# Patient Record
Sex: MALE | Race: WHITE | NOT HISPANIC OR LATINO | Employment: FULL TIME | URBAN - METROPOLITAN AREA
[De-identification: names, ages, dates, MRNs, and addresses within clinical notes are randomized per-mention and may not be internally consistent; named-entity substitution may affect disease eponyms.]

---

## 2018-02-12 ENCOUNTER — OFFICE VISIT (OUTPATIENT)
Dept: URGENT CARE | Age: 68
End: 2018-02-12
Payer: COMMERCIAL

## 2018-02-12 VITALS
HEIGHT: 70 IN | OXYGEN SATURATION: 98 % | HEART RATE: 78 BPM | DIASTOLIC BLOOD PRESSURE: 63 MMHG | BODY MASS INDEX: 26.92 KG/M2 | SYSTOLIC BLOOD PRESSURE: 136 MMHG | WEIGHT: 188 LBS | RESPIRATION RATE: 18 BRPM | TEMPERATURE: 98.6 F

## 2018-02-12 DIAGNOSIS — J20.9 ACUTE BRONCHITIS, UNSPECIFIED ORGANISM: Primary | ICD-10-CM

## 2018-02-12 PROCEDURE — 99214 OFFICE O/P EST MOD 30 MIN: CPT | Performed by: PREVENTIVE MEDICINE

## 2018-02-12 RX ORDER — NITROGLYCERIN 0.4 MG/1
0.4 TABLET SUBLINGUAL
COMMUNITY
Start: 2017-07-05 | End: 2022-06-29

## 2018-02-12 RX ORDER — TAMSULOSIN HYDROCHLORIDE 0.4 MG/1
0.4 CAPSULE ORAL
COMMUNITY
End: 2018-06-20 | Stop reason: SDUPTHER

## 2018-02-12 RX ORDER — ATORVASTATIN CALCIUM 40 MG/1
TABLET, FILM COATED ORAL
COMMUNITY
Start: 2017-12-14

## 2018-02-12 RX ORDER — DUTASTERIDE 0.5 MG/1
0.5 CAPSULE, LIQUID FILLED ORAL
COMMUNITY
End: 2018-07-12 | Stop reason: SDUPTHER

## 2018-02-12 RX ORDER — CLOPIDOGREL BISULFATE 75 MG/1
75 TABLET ORAL
COMMUNITY
Start: 2017-07-28 | End: 2018-10-05

## 2018-02-12 RX ORDER — ALBUTEROL SULFATE 90 UG/1
2 AEROSOL, METERED RESPIRATORY (INHALATION) EVERY 4 HOURS PRN
Qty: 1 INHALER | Refills: 0 | Status: SHIPPED | OUTPATIENT
Start: 2018-02-12 | End: 2020-02-06 | Stop reason: ALTCHOICE

## 2018-02-12 RX ORDER — AMOXICILLIN AND CLAVULANATE POTASSIUM 875; 125 MG/1; MG/1
1 TABLET, FILM COATED ORAL EVERY 12 HOURS SCHEDULED
Qty: 20 TABLET | Refills: 0 | Status: SHIPPED | OUTPATIENT
Start: 2018-02-12 | End: 2018-02-22

## 2018-02-12 RX ORDER — PREDNISONE 10 MG/1
TABLET ORAL
Qty: 30 TABLET | Refills: 0 | Status: ON HOLD | OUTPATIENT
Start: 2018-02-12 | End: 2018-10-23

## 2018-02-12 RX ORDER — NICOTINE POLACRILEX 2 MG
1 GUM BUCCAL
COMMUNITY
End: 2022-06-29

## 2018-02-12 RX ORDER — DIAZEPAM 5 MG/1
TABLET ORAL
COMMUNITY
Start: 2017-06-11

## 2018-02-12 NOTE — PROGRESS NOTES
[address] [facility]        NAME: Precious Mack is a 78 y o  male  : 1950    MRN: [id number]  DATE: 2018  TIME: 8:39 AM    Assessment and Plan   Acute bronchitis, unspecified organism [J20 9]  1  Acute bronchitis, unspecified organism  predniSONE 10 mg tablet    amoxicillin-clavulanate (AUGMENTIN) 875-125 mg per tablet    albuterol (PROVENTIL HFA,VENTOLIN HFA) 90 mcg/act inhaler         Patient Instructions       Begin prednisone taper with food  Use inhaler as directed for coughing fits/shortness of breath  If no improvement in 2-3 days, begin Augmentin  Finish all if taking  Probiotics daily if taking  Follow up with PCP in 3-5 days  Proceed to  ER if symptoms worsen  Chief Complaint     Chief Complaint   Patient presents with    Cold Like Symptoms         History of Present Illness   Precious Mack presents to the clinic c/o    23-year-old male presents complaints cough x5 days  He states the cough occurs when he breathes, talks, and he is also complaining of coughing fits in the morning and at night  The cough is productive at times  He does complain of some nasal congestion but denies any nasal drainage  He feels somewhat better overall, but is still experiencing coughing fits  He had a subjective fever the first day of the illness  He has been taking Robitussin for the cough  Review of Systems   Review of Systems   Constitutional: Positive for fatigue and fever  Negative for activity change  HENT: Positive for congestion  Negative for rhinorrhea and sinus pain  Respiratory: Positive for cough and chest tightness  Negative for wheezing  Cardiovascular: Negative for chest pain  All other systems reviewed and are negative          Current Medications     Long-Term Prescriptions   Medication Sig Dispense Refill    aspirin 81 MG tablet Take 81 mg by mouth      atorvastatin (LIPITOR) 40 mg tablet TAKE 1 TABLET BY MOUTH DAILY AT NIGHT      Biotin 1 MG CAPS Take 1 tablet by mouth      Cholecalciferol 1000 units CHEW Chew      clopidogrel (PLAVIX) 75 mg tablet Take 75 mg by mouth      diazepam (VALIUM) 5 mg tablet TAKE 1 TABLET BY MOUTH DAILY AS NEEDED      dutasteride (AVODART) 0 5 mg capsule Take 0 5 mg by mouth      nitroglycerin (NITROSTAT) 0 4 mg SL tablet Place 0 4 mg under the tongue      tamsulosin (FLOMAX) 0 4 mg Take 0 4 mg by mouth         Current Allergies     Allergies as of 02/12/2018    (No Known Allergies)            The following portions of the patient's history were reviewed and updated as appropriate: allergies, current medications, past family history, past medical history, past social history, past surgical history and problem list     Objective   /63   Pulse 78   Temp 98 6 °F (37 °C) (Temporal)   Resp 18   Ht 5' 10" (1 778 m)   Wt 85 3 kg (188 lb)   SpO2 98%   BMI 26 98 kg/m²        Physical Exam     Physical Exam   Constitutional: He is oriented to person, place, and time  He appears well-developed and well-nourished  HENT:   Head: Normocephalic  Right Ear: External ear normal    Left Ear: External ear normal    Nose: Nose normal    Mouth/Throat: Oropharynx is clear and moist    Neck: Neck supple  Cardiovascular: Normal rate, regular rhythm and normal heart sounds  Pulmonary/Chest: Breath sounds normal    Dry cough noted on exam   Neurological: He is alert and oriented to person, place, and time  Psychiatric: He has a normal mood and affect  His behavior is normal    Nursing note and vitals reviewed

## 2018-02-12 NOTE — PATIENT INSTRUCTIONS
Begin prednisone taper with food  Use inhaler as directed for coughing fits/shortness of breath  If no improvement in 2-3 days, begin Augmentin  Finish all if taking  Probiotics daily if taking    Acute Bronchitis   WHAT YOU NEED TO KNOW:   Acute bronchitis is swelling and irritation in the air passages of your lungs  This irritation may cause you to cough or have other breathing problems  Acute bronchitis often starts because of another illness, such as a cold or the flu  The illness spreads from your nose and throat to your windpipe and airways  Bronchitis is often called a chest cold  Acute bronchitis lasts about 3 to 6 weeks and is usually not a serious illness  Your cough can last for several weeks  DISCHARGE INSTRUCTIONS:   Return to the emergency department if:   · You cough up blood  · Your lips or fingernails turn blue  · You feel like you are not getting enough air when you breathe  Contact your healthcare provider if:   · You have a fever  · Your breathing problems do not go away or get worse  · Your cough does not get better within 4 weeks  · You have questions or concerns about your condition or care  Self-care:   · Get more rest   Rest helps your body to heal  Slowly start to do more each day  Rest when you feel it is needed  · Avoid irritants in the air  Avoid chemicals, fumes, and dust  Wear a face mask if you must work around dust or fumes  Stay inside on days when air pollution levels are high  If you have allergies, stay inside when pollen counts are high  Do not use aerosol products, such as spray-on deodorant, bug spray, and hair spray  · Do not smoke or be around others who smoke  Nicotine and other chemicals in cigarettes and cigars damages the cilia that move mucus out of your lungs  Ask your healthcare provider for information if you currently smoke and need help to quit  E-cigarettes or smokeless tobacco still contain nicotine   Talk to your healthcare provider before you use these products  · Drink liquids as directed  Liquids help keep your air passages moist and help you cough up mucus  You may need to drink more liquids when you have acute bronchitis  Ask how much liquid to drink each day and which liquids are best for you  · Use a humidifier or vaporizer  Use a cool mist humidifier or a vaporizer to increase air moisture in your home  This may make it easier for you to breathe and help decrease your cough  Decrease risk for acute bronchitis:   · Get the vaccinations you need  Ask your healthcare provider if you should get vaccinated against the flu or pneumonia  · Prevent the spread of germs  You can decrease your risk of acute bronchitis and other illnesses by doing the following:     [facility] [place] your hands often with soap and water  Carry germ-killing hand lotion or gel with you  You can use the lotion or gel to clean your hands when soap and water are not available  ¨ Do not touch your eyes, nose, or mouth unless you have washed your hands first     ¨ Always cover your mouth when you cough to prevent the spread of germs  It is best to cough into a tissue or your shirt sleeve instead of into your hand  Ask those around you cover their mouths when they cough  ¨ Try to avoid people who have a cold or the flu  If you are sick, stay away from others as much as possible  Medicines: Your healthcare provider may  give you any of the following:  · Ibuprofen or acetaminophen  are medicines that help lower your fever  They are available without a doctor's order  Ask your healthcare provider which medicine is right for you  Ask how much to take and how often to take it  Follow directions  These medicines can cause stomach bleeding if not taken correctly  Ibuprofen can cause kidney damage  Do not take ibuprofen if you have kidney disease, an ulcer, or allergies to aspirin  Acetaminophen can cause liver damage   Do not take more than 4,000 milligrams in 24 hours      · Decongestants  help loosen mucus in your lungs and make it easier to cough up  This can help you breathe easier  · Cough suppressants  decrease your urge to cough  If your cough produces mucus, do not take a cough suppressant unless your healthcare provider tells you to  Your healthcare provider may suggest that you take a cough suppressant at night so you can rest     · Inhalers  may be given  Your healthcare provider may give you one or more inhalers to help you breathe easier and cough less  An inhaler gives your medicine to open your airways  Ask your healthcare provider to show you how to use your inhaler correctly  · Take your medicine as directed  Contact your healthcare provider if you think your medicine is not helping or if you have side effects  Tell him of her if you are allergic to any medicine  Keep a list of the medicines, vitamins, and herbs you take  Include the amounts, and when and why you take them  Bring the list or the pill bottles to follow-up visits  Carry your medicine list with you in case of an emergency  Follow up with your healthcare provider as directed:  Write down questions you have so you will remember to ask them during your follow-up visits  © 2017 [address] Information is for End User's use only and may not be sold, redistributed or otherwise used for commercial purposes  All illustrations and images included in CareNotes® are the copyrighted property of A D A M [employer] Inc  or Kevon Silverman  The above information is an  only  It is not intended as medical advice for individual conditions or treatments  Talk to your doctor, nurse or pharmacist before following any medical regimen to see if it is safe and effective for you

## 2018-03-26 ENCOUNTER — OFFICE VISIT (OUTPATIENT)
Dept: UROLOGY | Facility: CLINIC | Age: 68
End: 2018-03-26
Payer: COMMERCIAL

## 2018-03-26 VITALS
DIASTOLIC BLOOD PRESSURE: 64 MMHG | BODY MASS INDEX: 29.2 KG/M2 | HEIGHT: 70 IN | WEIGHT: 204 LBS | SYSTOLIC BLOOD PRESSURE: 130 MMHG

## 2018-03-26 DIAGNOSIS — N40.0 BENIGN LOCALIZED HYPERPLASIA OF PROSTATE WITHOUT URINARY OBSTRUCTION: ICD-10-CM

## 2018-03-26 DIAGNOSIS — N40.1 BPH WITH OBSTRUCTION/LOWER URINARY TRACT SYMPTOMS: Primary | ICD-10-CM

## 2018-03-26 DIAGNOSIS — N13.8 BPH WITH OBSTRUCTION/LOWER URINARY TRACT SYMPTOMS: Primary | ICD-10-CM

## 2018-03-26 LAB
SL AMB  POCT GLUCOSE, UA: NORMAL
SL AMB LEUKOCYTE ESTERASE,UA: NORMAL
SL AMB POCT BILIRUBIN,UA: NORMAL
SL AMB POCT BLOOD,UA: NORMAL
SL AMB POCT CLARITY,UA: CLEAR
SL AMB POCT COLOR,UA: YELLOW
SL AMB POCT KETONES,UA: NORMAL
SL AMB POCT NITRITE,UA: NORMAL
SL AMB POCT PH,UA: 5
SL AMB POCT SPECIFIC GRAVITY,UA: NORMAL
SL AMB POCT URINE PROTEIN: NORMAL
SL AMB POCT UROBILINOGEN: NORMAL

## 2018-03-26 PROCEDURE — 51798 US URINE CAPACITY MEASURE: CPT | Performed by: UROLOGY

## 2018-03-26 PROCEDURE — 81002 URINALYSIS NONAUTO W/O SCOPE: CPT | Performed by: UROLOGY

## 2018-03-26 PROCEDURE — 99213 OFFICE O/P EST LOW 20 MIN: CPT | Performed by: UROLOGY

## 2018-03-26 NOTE — PROGRESS NOTES
Progress Note - Urology  Dakota Oliva 78 y o  male MRN: [id number]  Encounter: [id number]      Chief Complaint:   Chief Complaint   Patient presents with    Benign Prostatic Hypertrophy     6 Month FU / PSA 6 04 (03/24/18)       HPI:     history of BPH with prominent lower urinary tract symptoms  He has been on a combination of dutasteride and tamsulosin  Previous PVR was 139 mL  His current AUA index is 10  He has no apparent side effects of the medications  He does have nocturia x2  He still feels restricted and concerned that he may developed episodes of retention  His stream is weaker at night  PSA is 6 04 and is stable   Prior prostate biopsy was benign in March of 2017, and also a biopsy in 2013    MEDS:    Current Outpatient Prescriptions:     aspirin 81 MG tablet, Take 81 mg by mouth, Disp: , Rfl:     atorvastatin (LIPITOR) 40 mg tablet, TAKE 1 TABLET BY MOUTH DAILY AT NIGHT, Disp: , Rfl:     Biotin 1 MG CAPS, Take 1 tablet by mouth, Disp: , Rfl:     Cholecalciferol 1000 units CHEW, Chew, Disp: , Rfl:     CITALOPRAM HYDROBROMIDE PO, Take by mouth, Disp: , Rfl:     clopidogrel (PLAVIX) 75 mg tablet, Take 75 mg by mouth, Disp: , Rfl:     diazepam (VALIUM) 5 mg tablet, TAKE 1 TABLET BY MOUTH DAILY AS NEEDED, Disp: , Rfl:     dutasteride (AVODART) 0 5 mg capsule, Take 0 5 mg by mouth, Disp: , Rfl:     GLUCOS-BG-MSM-IU-J-AFJA-SECU PO, Take by mouth, Disp: , Rfl:     Multiple Vitamins-Minerals (MULTIVITAMIN PO), Take by mouth, Disp: , Rfl:     nitroglycerin (NITROSTAT) 0 4 mg SL tablet, Place 0 4 mg under the tongue, Disp: , Rfl:     tamsulosin (FLOMAX) 0 4 mg, Take 0 4 mg by mouth, Disp: , Rfl:     albuterol (PROVENTIL HFA,VENTOLIN HFA) 90 mcg/act inhaler, Inhale 2 puffs every 4 (four) hours as needed for wheezing, Disp: 1 Inhaler, Rfl: 0    DOCOSAHEXAENOIC ACID PO, Take 1 tablet by mouth, Disp: , Rfl:     predniSONE 10 mg tablet, Take 4 po x 3 days, then 3 po x 3 days, then 2 po x 3 days, then 1 po x 3 days then stop , Disp: 30 tablet, Rfl: 0      PMH:  Past Medical History:   Diagnosis Date    Abnormal serum cholesterol     Back strain     BPH (benign prostatic hyperplasia)     CAD (coronary artery disease)          ROS:  Review of Systems      Vitals:  Blood pressure 130/64, height 5' 10" (1 778 m), weight 92 5 kg (204 lb)  Physical Exam:     Well-developed male in no acute distress  Back reveals no CVA   tenderness  Abdomen is soft  Rectal tone is normal   Prostate is symmetrical benign without nodules  Roughly 35- 38 g  Lab, Imaging and other studies:  Recent Results (from the past 48 hour(s))   POCT urine dip    Collection Time: 03/26/18  9:09 AM   Result Value Ref Range    LEUKOCYTE ESTERASE,UA NEG      NITRITE,UA NEG     SL AMB POCT UROBILINOGEN N/A     SL AMB POCT URINE PROTEIN NEG      PH,UA 5      BLOOD,UA NEG      SPECIFIC GRAVITY,UA N/A      KETONES,UA NEG      BILIRUBIN,UA N/A     GLUCOSE, UA NEG      COLOR,UA Yellow      CLARITY,UA Clear      Current PVR was 56 mL    IMPRESSION:   BPH with lower tract symptoms    PLAN:   I will reassess him in 6 months  His PSA is stable at this time    Consider cystoscopy if he is still having concerns of other options of management

## 2018-03-30 ENCOUNTER — TRANSCRIBE ORDERS (OUTPATIENT)
Dept: ADMINISTRATIVE | Facility: HOSPITAL | Age: 68
End: 2018-03-30

## 2018-03-30 DIAGNOSIS — H91.91 HEARING LOSS OF RIGHT EAR, UNSPECIFIED HEARING LOSS TYPE: Primary | ICD-10-CM

## 2018-06-20 DIAGNOSIS — N40.1 BPH WITH OBSTRUCTION/LOWER URINARY TRACT SYMPTOMS: Primary | ICD-10-CM

## 2018-06-20 DIAGNOSIS — N13.8 BPH WITH OBSTRUCTION/LOWER URINARY TRACT SYMPTOMS: Primary | ICD-10-CM

## 2018-06-20 RX ORDER — TAMSULOSIN HYDROCHLORIDE 0.4 MG/1
CAPSULE ORAL
Qty: 180 CAPSULE | Refills: 3 | Status: SHIPPED | OUTPATIENT
Start: 2018-06-20 | End: 2019-06-10 | Stop reason: SDUPTHER

## 2018-07-12 DIAGNOSIS — N40.1 BENIGN PROSTATIC HYPERPLASIA WITH LOWER URINARY TRACT SYMPTOMS, SYMPTOM DETAILS UNSPECIFIED: Primary | ICD-10-CM

## 2018-07-12 RX ORDER — DUTASTERIDE 0.5 MG/1
CAPSULE, LIQUID FILLED ORAL
Qty: 90 CAPSULE | Refills: 2 | Status: SHIPPED | OUTPATIENT
Start: 2018-07-12 | End: 2019-04-09 | Stop reason: SDUPTHER

## 2018-09-28 ENCOUNTER — OFFICE VISIT (OUTPATIENT)
Dept: UROLOGY | Facility: CLINIC | Age: 68
End: 2018-09-28
Payer: COMMERCIAL

## 2018-09-28 VITALS
SYSTOLIC BLOOD PRESSURE: 104 MMHG | DIASTOLIC BLOOD PRESSURE: 74 MMHG | HEART RATE: 69 BPM | BODY MASS INDEX: 29.27 KG/M2 | HEIGHT: 70 IN

## 2018-09-28 DIAGNOSIS — N40.1 BENIGN PROSTATIC HYPERPLASIA WITH LOWER URINARY TRACT SYMPTOMS, SYMPTOM DETAILS UNSPECIFIED: Primary | ICD-10-CM

## 2018-09-28 LAB
SL AMB  POCT GLUCOSE, UA: NORMAL
SL AMB LEUKOCYTE ESTERASE,UA: NORMAL
SL AMB POCT BILIRUBIN,UA: NORMAL
SL AMB POCT BLOOD,UA: NORMAL
SL AMB POCT CLARITY,UA: CLEAR
SL AMB POCT COLOR,UA: YELLOW
SL AMB POCT KETONES,UA: NORMAL
SL AMB POCT NITRITE,UA: NORMAL
SL AMB POCT PH,UA: 5
SL AMB POCT SPECIFIC GRAVITY,UA: 1.02
SL AMB POCT URINE PROTEIN: NORMAL
SL AMB POCT UROBILINOGEN: NORMAL

## 2018-09-28 PROCEDURE — 99213 OFFICE O/P EST LOW 20 MIN: CPT | Performed by: UROLOGY

## 2018-09-28 PROCEDURE — 81002 URINALYSIS NONAUTO W/O SCOPE: CPT | Performed by: UROLOGY

## 2018-09-28 RX ORDER — CHLORAL HYDRATE 500 MG
CAPSULE ORAL
COMMUNITY

## 2018-09-28 RX ORDER — AMPICILLIN TRIHYDRATE 250 MG
CAPSULE ORAL
COMMUNITY

## 2018-09-28 NOTE — LETTER
September 28, 2018     Davia Dubin, DO  [facility]  [address] [facility]  [address]    Patient: Betty Licona   YOB: 1950   Date of Visit: 9/28/2018       Dear Dr Fei Moreno:    Thank you for referring Jeff Lazo to me for evaluation  Below are my notes for this consultation  If you have questions, please do not hesitate to call me  I look forward to following your patient along with you  Sincerely,        Cecily Ruvalcaba MD        CC: DO Cecily Menon MD  9/28/2018  9:04 AM  Sign at close encounter  Progress Note - Urology  Betty Licona 80 y o  male MRN: [id number]  Encounter: [id number]      Chief Complaint:   Chief Complaint   Patient presents with    Benign Prostatic Hypertrophy     6 Month Follow Up       HPI:  52-year-old male who presents for discussion of options of prostatic outlet obstruction management  He has been on 0 8 mg of tamsulosin as well as dutasteride for several years without significant improvement  His prostate in fact has increased in size as measured on ultrasound and is now 115 g  He has had elevated PSAs most recently was 6 0 while on dutasteride  He had a biopsy in March of 2017 and a biopsy in 2013 both of which were completely benign  He had a consultation at [facility] at which point an MRI study was done showing low level risk for significant prostate cancer  The PSA has remained relatively stable on this dosage  Continues to have residuals over 130 cc  Options of management reviewed in detail at this time  As the gland is prominently enlarged the Urolift procedure would be questionable  We discussed transurethral section of prostate and laser prostatectomy at length as well  We discussed risks and complications of these procedures as well        MEDS:    Current Outpatient Prescriptions:     aspirin 81 MG tablet, Take 81 mg by mouth, Disp: , Rfl:     atorvastatin (LIPITOR) 40 mg tablet, TAKE 1 TABLET BY MOUTH DAILY AT NIGHT, Disp: , Rfl:     Biotin 1 MG CAPS, Take 1 tablet by mouth, Disp: , Rfl:     Cholecalciferol 1000 units CHEW, Chew, Disp: , Rfl:     CITALOPRAM HYDROBROMIDE PO, Take by mouth, Disp: , Rfl:     clopidogrel (PLAVIX) 75 mg tablet, Take 75 mg by mouth, Disp: , Rfl:     Coenzyme Q10 (COQ10) 200 MG CAPS, Take by mouth, Disp: , Rfl:     diazepam (VALIUM) 5 mg tablet, TAKE 1 TABLET BY MOUTH DAILY AS NEEDED, Disp: , Rfl:     DOCOSAHEXAENOIC ACID PO, Take 1 tablet by mouth, Disp: , Rfl:     dutasteride (AVODART) 0 5 mg capsule, TAKE ONE CAPSULE BY MOUTH EVERY DAY, Disp: 90 capsule, Rfl: 2    GLUCOS-BG-MSM-OX-R-SLNW-SECU PO, Take by mouth, Disp: , Rfl:     Multiple Vitamins-Minerals (MULTIVITAMIN PO), Take by mouth, Disp: , Rfl:     Omega-3 Fatty Acids (FISH OIL) 1,000 mg, Take by mouth, Disp: , Rfl:     tamsulosin (FLOMAX) 0 4 mg, TAKE 1 CAPSULE TWICE A DAY, Disp: 180 capsule, Rfl: 3    albuterol (PROVENTIL HFA,VENTOLIN HFA) 90 mcg/act inhaler, Inhale 2 puffs every 4 (four) hours as needed for wheezing (Patient not taking: Reported on 9/28/2018 ), Disp: 1 Inhaler, Rfl: 0    nitroglycerin (NITROSTAT) 0 4 mg SL tablet, Place 0 4 mg under the tongue, Disp: , Rfl:     predniSONE 10 mg tablet, Take 4 po x 3 days, then 3 po x 3 days, then 2 po x 3 days, then 1 po x 3 days then stop  (Patient not taking: Reported on 9/28/2018 ), Disp: 30 tablet, Rfl: 0      PMH:  Past Medical History:   Diagnosis Date    Abnormal serum cholesterol     Back strain     BPH (benign prostatic hyperplasia)     CAD (coronary artery disease)          PSH  Past Surgical History:   Procedure Laterality Date    ACHILLES TENDON REPAIR      CATARACT EXTRACTION Right     CORONARY ANGIOPLASTY WITH STENT PLACEMENT      KNEE SURGERY           ROS:  Review of Systems      Vitals:  Blood pressure 104/74, pulse 69, height 5' 10" (1 778 m)  Physical Exam:      no clinical examination performed at this time    The appointment spent in review of the presentation, pathology, and planned for management     Lab, Imaging and other studies:  Recent Results (from the past 672 hour(s))   POCT urine dip    Collection Time: 09/28/18  8:22 AM   Result Value Ref Range    LEUKOCYTE ESTERASE,UA neg     NITRITE,UA neg     SL AMB POCT UROBILINOGEN neg     SL AMB POCT URINE PROTEIN neg      PH,UA 5      BLOOD,UA neg     SPECIFIC GRAVITY,UA 1 020     KETONES,UA neg      BILIRUBIN,UA neg     GLUCOSE, UA neg      COLOR,UA yellow      CLARITY,UA clear            IMPRESSION:   prostatic hypertrophy with outlet obstruction    PLAN:   cystoscopy for further evaluation and planning for of intervention   He will need cardiac clearance prior to procedure

## 2018-09-28 NOTE — PROGRESS NOTES
Progress Note - Urology  Gasper Mendosa 80 y o  male MRN: [id number]  Encounter: [id number]      Chief Complaint:   Chief Complaint   Patient presents with    Benign Prostatic Hypertrophy     6 Month Follow Up       HPI:  21-year-old male who presents for discussion of options of prostatic outlet obstruction management  He has been on 0 8 mg of tamsulosin as well as dutasteride for several years without significant improvement  His prostate in fact has increased in size as measured on ultrasound and is now 115 g  He has had elevated PSAs most recently was 6 0 while on dutasteride  He had a biopsy in March of 2017 and a biopsy in 2013 both of which were completely benign  He had a consultation at [facility] at which point an MRI study was done showing low level risk for significant prostate cancer  The PSA has remained relatively stable on this dosage  Continues to have residuals over 130 cc  Options of management reviewed in detail at this time  As the gland is prominently enlarged the Urolift procedure would be questionable  We discussed transurethral section of prostate and laser prostatectomy at length as well  We discussed risks and complications of these procedures as well        MEDS:    Current Outpatient Prescriptions:     aspirin 81 MG tablet, Take 81 mg by mouth, Disp: , Rfl:     atorvastatin (LIPITOR) 40 mg tablet, TAKE 1 TABLET BY MOUTH DAILY AT NIGHT, Disp: , Rfl:     Biotin 1 MG CAPS, Take 1 tablet by mouth, Disp: , Rfl:     Cholecalciferol 1000 units CHEW, Chew, Disp: , Rfl:     CITALOPRAM HYDROBROMIDE PO, Take by mouth, Disp: , Rfl:     clopidogrel (PLAVIX) 75 mg tablet, Take 75 mg by mouth, Disp: , Rfl:     Coenzyme Q10 (COQ10) 200 MG CAPS, Take by mouth, Disp: , Rfl:     diazepam (VALIUM) 5 mg tablet, TAKE 1 TABLET BY MOUTH DAILY AS NEEDED, Disp: , Rfl:     DOCOSAHEXAENOIC ACID PO, Take 1 tablet by mouth, Disp: , Rfl:     dutasteride (AVODART) 0 5 mg capsule, TAKE ONE CAPSULE BY MOUTH EVERY DAY, Disp: 90 capsule, Rfl: 2    GLUCOS-BG-MSM-IS-E-BHKU-SECU PO, Take by mouth, Disp: , Rfl:     Multiple Vitamins-Minerals (MULTIVITAMIN PO), Take by mouth, Disp: , Rfl:     Omega-3 Fatty Acids (FISH OIL) 1,000 mg, Take by mouth, Disp: , Rfl:     tamsulosin (FLOMAX) 0 4 mg, TAKE 1 CAPSULE TWICE A DAY, Disp: 180 capsule, Rfl: 3    albuterol (PROVENTIL HFA,VENTOLIN HFA) 90 mcg/act inhaler, Inhale 2 puffs every 4 (four) hours as needed for wheezing (Patient not taking: Reported on 9/28/2018 ), Disp: 1 Inhaler, Rfl: 0    nitroglycerin (NITROSTAT) 0 4 mg SL tablet, Place 0 4 mg under the tongue, Disp: , Rfl:     predniSONE 10 mg tablet, Take 4 po x 3 days, then 3 po x 3 days, then 2 po x 3 days, then 1 po x 3 days then stop  (Patient not taking: Reported on 9/28/2018 ), Disp: 30 tablet, Rfl: 0      PMH:  Past Medical History:   Diagnosis Date    Abnormal serum cholesterol     Back strain     BPH (benign prostatic hyperplasia)     CAD (coronary artery disease)          PSH  Past Surgical History:   Procedure Laterality Date    ACHILLES TENDON REPAIR      CATARACT EXTRACTION Right     CORONARY ANGIOPLASTY WITH STENT PLACEMENT      KNEE SURGERY           ROS:  Review of Systems      Vitals:  Blood pressure 104/74, pulse 69, height 5' 10" (1 778 m)  Physical Exam:      no clinical examination performed at this time    The appointment spent in review of the presentation, pathology, and planned for management     Lab, Imaging and other studies:  Recent Results (from the past 672 hour(s))   POCT urine dip    Collection Time: 09/28/18  8:22 AM   Result Value Ref Range    LEUKOCYTE ESTERASE,UA neg     NITRITE,UA neg     SL AMB POCT UROBILINOGEN neg     SL AMB POCT URINE PROTEIN neg      PH,UA 5      BLOOD,UA neg     SPECIFIC GRAVITY,UA 1 020     KETONES,UA neg      BILIRUBIN,UA neg     GLUCOSE, UA neg      COLOR,UA yellow      CLARITY,UA clear            IMPRESSION:   prostatic hypertrophy with outlet obstruction    PLAN:   cystoscopy for further evaluation and planning for of intervention   He will need cardiac clearance prior to procedure

## 2018-10-05 ENCOUNTER — OFFICE VISIT (OUTPATIENT)
Dept: GASTROENTEROLOGY | Facility: AMBULARY SURGERY CENTER | Age: 68
End: 2018-10-05
Payer: COMMERCIAL

## 2018-10-05 VITALS
HEIGHT: 70 IN | TEMPERATURE: 98.4 F | HEART RATE: 67 BPM | DIASTOLIC BLOOD PRESSURE: 70 MMHG | SYSTOLIC BLOOD PRESSURE: 132 MMHG | WEIGHT: 205.6 LBS | BODY MASS INDEX: 29.43 KG/M2

## 2018-10-05 DIAGNOSIS — Z86.010 HISTORY OF COLON POLYPS: ICD-10-CM

## 2018-10-05 DIAGNOSIS — Z80.0 FAMILY HISTORY OF COLON CANCER: Primary | ICD-10-CM

## 2018-10-05 PROBLEM — Z86.0100 HISTORY OF COLON POLYPS: Status: ACTIVE | Noted: 2018-10-05

## 2018-10-05 PROCEDURE — 99203 OFFICE O/P NEW LOW 30 MIN: CPT | Performed by: INTERNAL MEDICINE

## 2018-10-05 NOTE — PROGRESS NOTES
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 with a history of coronary disease, stent about greater than 1 year ago on Plavix, personal history of colon polyps 10 years ago, family history of colon cancer in his mother at the age of 79     3  Increased risk colon cancer screening with a personal history of polyps and family history of colon cancer  -patient is overdue for examination  -we will schedule his colonoscopy  -he will need to have his Plavix held, will get clearance from Cardiology  -discussed with him the risks of the procedure including bleeding, surgery, perforation, missed polyp detection rate  -I suggest that he may want to contact his urologist with regards to his urinary retention        _____________________________________________________________        CC:  Evaluation for colonoscopy    HPI:  Maribel Yan is a 80 y o male who was referred for evaluation of colonoscopy  As you know this is a pleasant 52-year-old gentle with history of BPH, coronary disease status post stent about 1 year ago, has a history of colon polyps about 10 years ago was last colonoscopy family history of colon cancer in mother was diagnosed at the age of 79  Patient denies any GI complaints, denies any nausea, vomiting, heartburn, dysphagia, diarrhea, constipation, melena, rectal bleeding, abdominal pain  Denies any tobacco, drinks rarely  He works as an insurance sales  He reports that if he drinks too much volume 1 time he does have urinary retention  ROS:  The remainder of the ROS was negative except for the pertinent positives mentioned in HPI  Allergies: Patient has no known allergies      Medications:   Current Outpatient Prescriptions:     aspirin 81 MG tablet, Take 81 mg by mouth, Disp: , Rfl:     atorvastatin (LIPITOR) 40 mg tablet, TAKE 1 TABLET BY MOUTH DAILY AT NIGHT, Disp: , Rfl:     Biotin 1 MG CAPS, Take 1 tablet by mouth, Disp: , Rfl:     Cholecalciferol 1000 units CHEW, Chew, Disp: , Rfl:     CITALOPRAM HYDROBROMIDE PO, Take by mouth, Disp: , Rfl:     clopidogrel (PLAVIX) 75 mg tablet, Take 75 mg by mouth, Disp: , Rfl:     Coenzyme Q10 (COQ10) 200 MG CAPS, Take by mouth, Disp: , Rfl:     diazepam (VALIUM) 5 mg tablet, TAKE 1 TABLET BY MOUTH DAILY AS NEEDED, Disp: , Rfl:     DOCOSAHEXAENOIC ACID PO, Take 1 tablet by mouth, Disp: , Rfl:     dutasteride (AVODART) 0 5 mg capsule, TAKE ONE CAPSULE BY MOUTH EVERY DAY, Disp: 90 capsule, Rfl: 2    GLUCOS-BG-MSM-LN-D-VTZJ-SECU PO, Take by mouth, Disp: , Rfl:     Multiple Vitamins-Minerals (MULTIVITAMIN PO), Take by mouth, Disp: , Rfl:     Omega-3 Fatty Acids (FISH OIL) 1,000 mg, Take by mouth, Disp: , Rfl:     predniSONE 10 mg tablet, Take 4 po x 3 days, then 3 po x 3 days, then 2 po x 3 days, then 1 po x 3 days then stop , Disp: 30 tablet, Rfl: 0    tamsulosin (FLOMAX) 0 4 mg, TAKE 1 CAPSULE TWICE A DAY, Disp: 180 capsule, Rfl: 3    albuterol (PROVENTIL HFA,VENTOLIN HFA) 90 mcg/act inhaler, Inhale 2 puffs every 4 (four) hours as needed for wheezing (Patient not taking: Reported on 9/28/2018 ), Disp: 1 Inhaler, Rfl: 0    nitroglycerin (NITROSTAT) 0 4 mg SL tablet, Place 0 4 mg under the tongue, Disp: , Rfl: '    Past Medical History:   Diagnosis Date    Abnormal serum cholesterol     Back strain     BPH (benign prostatic hyperplasia)     CAD (coronary artery disease)        Past Surgical History:   Procedure Laterality Date    ACHILLES TENDON REPAIR      CATARACT EXTRACTION Right     CORONARY ANGIOPLASTY WITH STENT PLACEMENT      KNEE SURGERY         Family History   Problem Relation Age of Onset    Colon cancer Mother     Prostate cancer Brother     Stroke Father         reports that he quit smoking about 28 years ago  He has never used smokeless tobacco  He reports that he drinks alcohol   He reports that he does not use drugs            Physical Exam:     /70 (BP Location: Left arm, Patient Position: Sitting, Cuff Size: Standard)   Pulse 67   Temp 98 4 °F (36 9 °C) (Tympanic)   Ht 5' 10" (1 778 m)   Wt 93 3 kg (205 lb 9 6 oz)   BMI 29 50 kg/m²     Gen: wn/wd, NAD, healthy-appearing male  HEENT: anicteric, MMM, no cervical LAD  CVS: RRR, no m/r/g  CHEST: CTA b/l  ABD: +BS, soft, NT,ND, no hepatosplenomegaly  EXT: no c/c/e  NEURO: aaox3  SKIN: NO rashes

## 2018-10-05 NOTE — LETTER
October 5, 2018     Holland Higginbotham   [place] [address] [place] [address]    Patient: Maribel Yan   YOB: 1950   Date of Visit: 10/5/2018       Dear Dr Esther Cox:    Thank you for referring Henna Loving to me for evaluation  Below are my notes for this consultation  If you have questions, please do not hesitate to call me  I look forward to following your patient along with you  Sincerely,        Brenden Bey MD        CC: No Recipients  Brenden Bey MD  10/5/2018 11:48 AM  Sign at close encounter  Consultation - [address] [facility]  Keli Renae [age] y o  male MRN: [id number]          Assessment & Plan:    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 with a history of coronary disease, stent about greater than 1 year ago on Plavix, personal history of colon polyps 10 years ago, family history of colon cancer in his mother at the age of 79     3  Increased risk colon cancer screening with a personal history of polyps and family history of colon cancer  -patient is overdue for examination  -we will schedule his colonoscopy  -he will need to have his Plavix held, will get clearance from Cardiology  -discussed with him the risks of the procedure including bleeding, surgery, perforation, missed polyp detection rate  -I suggest that he may want to contact his urologist with regards to his urinary retention        _____________________________________________________________        CC:  Evaluation for colonoscopy    HPI:  Maribel Yan is a 80 y o male who was referred for evaluation of colonoscopy  As you know this is a pleasant 52-year-old gentle with history of BPH, coronary disease status post stent about 1 year ago, has a history of colon polyps about 10 years ago was last colonoscopy family history of colon cancer in mother was diagnosed at the age of 79    Patient denies any GI complaints, denies any nausea, vomiting, heartburn, dysphagia, diarrhea, constipation, melena, rectal bleeding, abdominal pain  Denies any tobacco, drinks rarely  He works as an insurance sales  He reports that if he drinks too much volume 1 time he does have urinary retention  ROS:  The remainder of the ROS was negative except for the pertinent positives mentioned in HPI  Allergies: Patient has no known allergies      Medications:   Current Outpatient Prescriptions:     aspirin 81 MG tablet, Take 81 mg by mouth, Disp: , Rfl:     atorvastatin (LIPITOR) 40 mg tablet, TAKE 1 TABLET BY MOUTH DAILY AT NIGHT, Disp: , Rfl:     Biotin 1 MG CAPS, Take 1 tablet by mouth, Disp: , Rfl:     Cholecalciferol 1000 units CHEW, Chew, Disp: , Rfl:     CITALOPRAM HYDROBROMIDE PO, Take by mouth, Disp: , Rfl:     clopidogrel (PLAVIX) 75 mg tablet, Take 75 mg by mouth, Disp: , Rfl:     Coenzyme Q10 (COQ10) 200 MG CAPS, Take by mouth, Disp: , Rfl:     diazepam (VALIUM) 5 mg tablet, TAKE 1 TABLET BY MOUTH DAILY AS NEEDED, Disp: , Rfl:     DOCOSAHEXAENOIC ACID PO, Take 1 tablet by mouth, Disp: , Rfl:     dutasteride (AVODART) 0 5 mg capsule, TAKE ONE CAPSULE BY MOUTH EVERY DAY, Disp: 90 capsule, Rfl: 2    GLUCOS-BG-MSM-RS-P-TFJJ-SECU PO, Take by mouth, Disp: , Rfl:     Multiple Vitamins-Minerals (MULTIVITAMIN PO), Take by mouth, Disp: , Rfl:     Omega-3 Fatty Acids (FISH OIL) 1,000 mg, Take by mouth, Disp: , Rfl:     predniSONE 10 mg tablet, Take 4 po x 3 days, then 3 po x 3 days, then 2 po x 3 days, then 1 po x 3 days then stop , Disp: 30 tablet, Rfl: 0    tamsulosin (FLOMAX) 0 4 mg, TAKE 1 CAPSULE TWICE A DAY, Disp: 180 capsule, Rfl: 3    albuterol (PROVENTIL HFA,VENTOLIN HFA) 90 mcg/act inhaler, Inhale 2 puffs every 4 (four) hours as needed for wheezing (Patient not taking: Reported on 9/28/2018 ), Disp: 1 Inhaler, Rfl: 0    nitroglycerin (NITROSTAT) 0 4 mg SL tablet, Place 0 4 mg under the tongue, Disp: , Rfl: '    Past Medical History:   Diagnosis Date    Abnormal serum cholesterol     Back strain     BPH (benign prostatic hyperplasia)     CAD (coronary artery disease)        Past Surgical History:   Procedure Laterality Date    ACHILLES TENDON REPAIR      CATARACT EXTRACTION Right     CORONARY ANGIOPLASTY WITH STENT PLACEMENT      KNEE SURGERY         Family History   Problem Relation Age of Onset    Colon cancer Mother     Prostate cancer Brother     Stroke Father         reports that he quit smoking about 28 years ago  He has never used smokeless tobacco  He reports that he drinks alcohol  He reports that he does not use drugs            Physical Exam:     /70 (BP Location: Left arm, Patient Position: Sitting, Cuff Size: Standard)   Pulse 67   Temp 98 4 °F (36 9 °C) (Tympanic)   Ht 5' 10" (1 778 m)   Wt 93 3 kg (205 lb 9 6 oz)   BMI 29 50 kg/m²      Gen: wn/wd, NAD, healthy-appearing male  HEENT: anicteric, MMM, no cervical LAD  CVS: RRR, no m/r/g  CHEST: CTA b/l  ABD: +BS, soft, NT,ND, no hepatosplenomegaly  EXT: no c/c/e  NEURO: aaox3  SKIN: NO rashes

## 2018-10-08 ENCOUNTER — TELEPHONE (OUTPATIENT)
Dept: GASTROENTEROLOGY | Facility: AMBULARY SURGERY CENTER | Age: 68
End: 2018-10-08

## 2018-10-08 PROBLEM — Z86.010 HX OF COLONIC POLYPS: Status: ACTIVE | Noted: 2018-10-08

## 2018-10-08 PROBLEM — Z86.0100 HX OF COLONIC POLYPS: Status: ACTIVE | Noted: 2018-10-08

## 2018-10-12 ENCOUNTER — ANESTHESIA EVENT (OUTPATIENT)
Dept: PERIOP | Facility: AMBULARY SURGERY CENTER | Age: 68
End: 2018-10-12
Payer: COMMERCIAL

## 2018-10-23 ENCOUNTER — HOSPITAL ENCOUNTER (OUTPATIENT)
Facility: AMBULARY SURGERY CENTER | Age: 68
Setting detail: OUTPATIENT SURGERY
Discharge: HOME | End: 2018-10-23
Attending: INTERNAL MEDICINE | Admitting: INTERNAL MEDICINE
Payer: COMMERCIAL

## 2018-10-23 ENCOUNTER — ANESTHESIA (OUTPATIENT)
Dept: PERIOP | Facility: AMBULARY SURGERY CENTER | Age: 68
End: 2018-10-23
Payer: COMMERCIAL

## 2018-10-23 VITALS
SYSTOLIC BLOOD PRESSURE: 138 MMHG | HEART RATE: 65 BPM | RESPIRATION RATE: 18 BRPM | BODY MASS INDEX: 28.35 KG/M2 | DIASTOLIC BLOOD PRESSURE: 65 MMHG | HEIGHT: 70 IN | OXYGEN SATURATION: 98 % | WEIGHT: 198 LBS | TEMPERATURE: 97.4 F

## 2018-10-23 DIAGNOSIS — Z80.0 FAMILY HISTORY OF COLON CANCER: ICD-10-CM

## 2018-10-23 DIAGNOSIS — Z86.010 HX OF COLONIC POLYPS: ICD-10-CM

## 2018-10-23 PROBLEM — Z86.0100 HX OF COLONIC POLYPS: Status: RESOLVED | Noted: 2018-10-08 | Resolved: 2018-10-23

## 2018-10-23 PROCEDURE — 88305 TISSUE EXAM BY PATHOLOGIST: CPT | Performed by: PATHOLOGY

## 2018-10-23 PROCEDURE — 45385 COLONOSCOPY W/LESION REMOVAL: CPT | Performed by: INTERNAL MEDICINE

## 2018-10-23 RX ORDER — PROPOFOL 10 MG/ML
INJECTION, EMULSION INTRAVENOUS CONTINUOUS PRN
Status: DISCONTINUED | OUTPATIENT
Start: 2018-10-23 | End: 2018-10-23 | Stop reason: SURG

## 2018-10-23 RX ORDER — SODIUM CHLORIDE, SODIUM LACTATE, POTASSIUM CHLORIDE, CALCIUM CHLORIDE 600; 310; 30; 20 MG/100ML; MG/100ML; MG/100ML; MG/100ML
125 INJECTION, SOLUTION INTRAVENOUS CONTINUOUS
Status: DISCONTINUED | OUTPATIENT
Start: 2018-10-23 | End: 2018-10-23 | Stop reason: HOSPADM

## 2018-10-23 RX ORDER — PROPOFOL 10 MG/ML
INJECTION, EMULSION INTRAVENOUS AS NEEDED
Status: DISCONTINUED | OUTPATIENT
Start: 2018-10-23 | End: 2018-10-23 | Stop reason: SURG

## 2018-10-23 RX ADMIN — LIDOCAINE HYDROCHLORIDE 50 MG: 20 INJECTION, SOLUTION INTRAVENOUS at 11:15

## 2018-10-23 RX ADMIN — PROPOFOL 120 MCG/KG/MIN: 10 INJECTION, EMULSION INTRAVENOUS at 11:15

## 2018-10-23 RX ADMIN — SODIUM CHLORIDE, SODIUM LACTATE, POTASSIUM CHLORIDE, AND CALCIUM CHLORIDE: .6; .31; .03; .02 INJECTION, SOLUTION INTRAVENOUS at 11:14

## 2018-10-23 RX ADMIN — PROPOFOL 80 MG: 10 INJECTION, EMULSION INTRAVENOUS at 11:15

## 2018-10-23 NOTE — ANESTHESIA PREPROCEDURE EVALUATION
Review of Systems/Medical History  Patient summary reviewed  Chart reviewed  No history of anesthetic complications     Cardiovascular  Hyperlipidemia, CAD , Cardiac stents (on plavix) > 1 year    Pulmonary  Negative pulmonary ROS        GI/Hepatic    Bowel prep  Comment: fam hx colon ca  Hx colon polyps     Prostatic disorder, benign prostatic hyperplasia       Endo/Other  Negative endo/other ROS      GYN       Hematology  Negative hematology ROS      Musculoskeletal  Negative musculoskeletal ROS        Neurology  Negative neurology ROS      Psychology   Negative psychology ROS              Physical Exam    Airway    Mallampati score: II  TM Distance: >3 FB  Neck ROM: full     Dental   Comment: Left upper molar chipped,     Cardiovascular      Pulmonary      Other Findings        Anesthesia Plan  ASA Score- 2     Anesthesia Type- IV sedation with anesthesia with ASA Monitors  Additional Monitors:   Airway Plan:         Plan Factors-    Induction- intravenous  Postoperative Plan-     Informed Consent- Anesthetic plan and risks discussed with patient

## 2018-10-23 NOTE — ANESTHESIA POSTPROCEDURE EVALUATION
Post-Op Assessment Note      CV Status:  Stable    Mental Status:  Alert and awake    Hydration Status:  Stable    PONV Controlled:  None    Airway Patency:  Patent    Post Op Vitals Reviewed: Yes          Staff: Anesthesiologist           /65 (10/23/18 1154)    Temp     Pulse 65 (10/23/18 1154)   Resp 18 (10/23/18 1154)    SpO2 98 % (10/23/18 1154)

## 2018-10-23 NOTE — OP NOTE
Colonoscopy Procedure Note    Procedure: Colonoscopy    Sedation: Monitored anesthesia care, check anesthesia records      ASA Class: 2    INDICATIONS:  Personal history of colon polyps, family history of colon cancer    POST-OP DIAGNOSIS: See the impression below    Procedure Details     Prior colonoscopy: 10 years ago  Informed consent was obtained for the procedure, including sedation  Risks of perforation, hemorrhage, adverse drug reaction and aspiration were discussed  The patient was placed in the left lateral decubitus position  Based on the pre-procedure assessment, including review of the patient's medical history, medications, allergies, and review of systems, he had been deemed to be an appropriate candidate for conscious sedation; he was therefore sedated with the medications listed below  The patient was monitored continuously with telemetry, pulse oximetry, blood pressure monitoring, and direct observations  A rectal examination was performed  The colonoscope was inserted into the rectum and advanced under direct vision to the cecum, which was identified by the ileocecal valve and appendiceal orifice  The quality of the colonic preparation was good  A careful inspection was made as the colonoscope was withdrawn, including a retroflexed view of the rectum; findings and interventions are described below  Findings:    4 mm descending colon polyp removed by cold snare  3 mm rectal polyp removed by cold snare  Otherwise normal colonoscopy with good prep good visualization  Mild internal hemorrhoids on retroflexion           Complications: None; patient tolerated the procedure well  Impression:    Descending and rectal polyp removed by cold snare as outlined above  Internal hemorrhoids  Otherwise normal colonoscopy with good prep good visualization, endo cuff was used    Recommendations:  Repeat colonoscopy in 5 years or sooner if clinically indicated      Repeat colonoscopy is being recommended at an interval of less than 10 years, this is because of a personal history of polyps or colon cancer      Discharge home  Resume regular diet  Resume home medications  Follow up biopsy results  Call with any abdominal pain, bleeding, fevers

## 2018-10-23 NOTE — H&P
History and Physical -  [facility]  John Delacruz 80 y o  male MRN: [id number]    HPI: John Delacruz is a 80y o  year old male who presents with personal history of colon polyps, last colonoscopy 10 years      Review of Systems    Historical Information   Past Medical History:   Diagnosis Date    Abnormal serum cholesterol     Back strain     BPH (benign prostatic hyperplasia)     CAD (coronary artery disease)     Hyperlipidemia      Past Surgical History:   Procedure Laterality Date    ACHILLES TENDON REPAIR      CATARACT EXTRACTION Right     CORONARY ANGIOPLASTY WITH STENT PLACEMENT      KNEE SURGERY       Social History   History   Alcohol Use    Yes     Comment: occasionally, 2-3 per week  History   Drug Use No     History   Smoking Status    Former Smoker    Quit date: 10/5/1990   Smokeless Tobacco    Never Used     Family History   Problem Relation Age of Onset    Colon cancer Mother     Prostate cancer Brother     Stroke Father        Meds/Allergies     Prescriptions Prior to Admission   Medication    atorvastatin (LIPITOR) 40 mg tablet    CITALOPRAM HYDROBROMIDE PO    dutasteride (AVODART) 0 5 mg capsule    tamsulosin (FLOMAX) 0 4 mg    albuterol (PROVENTIL HFA,VENTOLIN HFA) 90 mcg/act inhaler    aspirin 81 MG tablet    Biotin 1 MG CAPS    Cholecalciferol 1000 units CHEW    clopidogrel (PLAVIX) 75 mg tablet    Coenzyme Q10 (COQ10) 200 MG CAPS    diazepam (VALIUM) 5 mg tablet    GLUCOS-BG-MSM-MV-S-CCHF-SECU PO    Multiple Vitamins-Minerals (MULTIVITAMIN PO)    nitroglycerin (NITROSTAT) 0 4 mg SL tablet    Omega-3 Fatty Acids (FISH OIL) 1,000 mg       No Known Allergies    Objective     Blood pressure 151/70, pulse 72, temperature 98 6 °F (37 °C), resp  rate 18, height 5' 10" (1 778 m), weight 89 8 kg (198 lb), SpO2 98 %        PHYSICAL EXAM    Gen: NAD  CV: RRR  CHEST: Clear  ABD: soft, NT/ND  EXT: no edema  Neuro: AAO      ASSESSMENT/PLAN:  This is a 80y o  year old male here for  personal history of colon polyps, last colonoscopy 10 years      PLAN:   Procedure: colonoscopy

## 2018-10-26 ENCOUNTER — PROCEDURE VISIT (OUTPATIENT)
Dept: UROLOGY | Facility: CLINIC | Age: 68
End: 2018-10-26
Payer: COMMERCIAL

## 2018-10-26 VITALS
DIASTOLIC BLOOD PRESSURE: 70 MMHG | SYSTOLIC BLOOD PRESSURE: 160 MMHG | WEIGHT: 207 LBS | BODY MASS INDEX: 29.63 KG/M2 | HEIGHT: 70 IN

## 2018-10-26 DIAGNOSIS — N40.1 BENIGN PROSTATIC HYPERPLASIA WITH LOWER URINARY TRACT SYMPTOMS, SYMPTOM DETAILS UNSPECIFIED: Primary | ICD-10-CM

## 2018-10-26 DIAGNOSIS — N52.9 ERECTILE DYSFUNCTION, UNSPECIFIED ERECTILE DYSFUNCTION TYPE: ICD-10-CM

## 2018-10-26 LAB
SL AMB  POCT GLUCOSE, UA: NORMAL
SL AMB LEUKOCYTE ESTERASE,UA: NORMAL
SL AMB POCT BILIRUBIN,UA: NORMAL
SL AMB POCT BLOOD,UA: NORMAL
SL AMB POCT CLARITY,UA: CLEAR
SL AMB POCT COLOR,UA: YELLOW
SL AMB POCT KETONES,UA: NORMAL
SL AMB POCT NITRITE,UA: NORMAL
SL AMB POCT PH,UA: 5
SL AMB POCT SPECIFIC GRAVITY,UA: 1.01
SL AMB POCT URINE PROTEIN: NORMAL
SL AMB POCT UROBILINOGEN: NORMAL

## 2018-10-26 PROCEDURE — 87086 URINE CULTURE/COLONY COUNT: CPT | Performed by: UROLOGY

## 2018-10-26 PROCEDURE — 81002 URINALYSIS NONAUTO W/O SCOPE: CPT | Performed by: UROLOGY

## 2018-10-26 PROCEDURE — 52000 CYSTOURETHROSCOPY: CPT | Performed by: UROLOGY

## 2018-10-26 RX ORDER — CLOPIDOGREL BISULFATE 75 MG/1
75 TABLET ORAL DAILY
Refills: 1 | COMMUNITY
Start: 2018-10-11

## 2018-10-26 RX ORDER — TADALAFIL 20 MG/1
20 TABLET ORAL SEE ADMIN INSTRUCTIONS
Qty: 10 TABLET | Refills: 3 | Status: SHIPPED | OUTPATIENT
Start: 2018-10-26 | End: 2018-10-27 | Stop reason: SDUPTHER

## 2018-10-26 NOTE — LETTER
2018     Holland Higginbotham DO  [facility]  [id number]    Patient: Maribel Yan   YOB: 1950   Date of Visit: 10/26/2018       Dear Dr Esther Cox:    Thank you for referring Henna Loving to me for evaluation  Below are my notes for this consultation  If you have questions, please do not hesitate to call me  I look forward to following your patient along with you  Sincerely,        Travis Larios MD        CC: No Recipients  Travis Larios MD  10/26/2018  9:59 AM  Sign at close encounter  Cystoscopy    Patient: Maribel Yan                       :    1950                                    Date:    10/26/2018    Surgeon:  Travis Larios MD    Preoperative Diagnosis:   Prostatic outlet obstruction    Postoperative Diagnosis:  Grade 3 +trilobar prostatic obstruction    Anesthesia: 2% lidocaine gel    Operative Procedure: Cystoscopy  Complications: None  Procedure: The patient was ID on the table  The patient was prepped and draped in sterile fashion  2% Local Lidocaine was placed  Five minutes passed before inspection of the bladder  A 14 fr flexible cystoscope was passed per meatus  Urethra was unremarkable  The prostate is entered showing increased urethral length  There is prominent bilobar hypertrophy with a significant amount of median tissue  Entering the bladder  There is mild trabeculation  There is no evidence of tumor or stone  The prostate component shows a large intravesical intrusion of the gland  The orifices were otherwise unremarkable  Scope was removed  Patient tolerated the procedure well and was given antibiotics prophylactically  This patient would require transurethral section of prostate if he wished to proceed with intervention  He will continue with tamsulosin 0 8 mg daily as well as the dutasteride 0 5 mg daily       he will discuss his status with the cardiology department to determine risk for surgery

## 2018-10-26 NOTE — PROGRESS NOTES
Cystoscopy    Patient: Diann Clay                       :    1950                                    Date:    10/26/2018    Surgeon:  Keyur Lazo MD    Preoperative Diagnosis:   Prostatic outlet obstruction    Postoperative Diagnosis:  Grade 3 +trilobar prostatic obstruction    Anesthesia: 2% lidocaine gel    Operative Procedure: Cystoscopy  Complications: None  Procedure: The patient was ID on the table  The patient was prepped and draped in sterile fashion  2% Local Lidocaine was placed  Five minutes passed before inspection of the bladder  A 14 fr flexible cystoscope was passed per meatus  Urethra was unremarkable  The prostate is entered showing increased urethral length  There is prominent bilobar hypertrophy with a significant amount of median tissue  Entering the bladder  There is mild trabeculation  There is no evidence of tumor or stone  The prostate component shows a large intravesical intrusion of the gland  The orifices were otherwise unremarkable  Scope was removed  Patient tolerated the procedure well and was given antibiotics prophylactically  This patient would require transurethral section of prostate if he wished to proceed with intervention  He will continue with tamsulosin 0 8 mg daily as well as the dutasteride 0 5 mg daily       he will discuss his status with the cardiology department to determine risk for surgery

## 2018-10-27 DIAGNOSIS — N52.9 ERECTILE DYSFUNCTION, UNSPECIFIED ERECTILE DYSFUNCTION TYPE: ICD-10-CM

## 2018-10-27 LAB — BACTERIA UR CULT: NORMAL

## 2018-10-28 RX ORDER — TADALAFIL 20 MG
TABLET ORAL
Qty: 12 TABLET | Refills: 0 | Status: SHIPPED | OUTPATIENT
Start: 2018-10-28 | End: 2021-10-11 | Stop reason: SDUPTHER

## 2018-10-29 ENCOUNTER — TELEPHONE (OUTPATIENT)
Dept: GASTROENTEROLOGY | Facility: CLINIC | Age: 68
End: 2018-10-29

## 2018-10-29 NOTE — LETTER
October 29, 2018    Micah Khanna  [address]   [place] [address]      Dear Mr Spangelr Michelle:    We have attempted to reach you regarding your results with no response  We ask that you contact our office upon receipt of this letter to receive your results  Thank you in advance for your cooperation and assistance        Sincerely,             Gisele Claude Dr [facility]

## 2018-10-29 NOTE — TELEPHONE ENCOUNTER
----- Message from Thaddeus Huang MD sent at 10/25/2018 11:24 AM EDT -----  Please inform the patient that 1 polyp removed was a tubular adenoma, the other polyp was a hyperplastic polyp  There was no high-grade dysplasia and no cancer  I recommend repeat colonoscopy in 5 years, please put in for recall  Please have the patient call with any questions or concerns

## 2018-12-04 ENCOUNTER — OFFICE VISIT (OUTPATIENT)
Dept: URGENT CARE | Age: 68
End: 2018-12-04
Payer: COMMERCIAL

## 2018-12-04 VITALS
SYSTOLIC BLOOD PRESSURE: 126 MMHG | WEIGHT: 198 LBS | OXYGEN SATURATION: 99 % | TEMPERATURE: 98.4 F | DIASTOLIC BLOOD PRESSURE: 58 MMHG | RESPIRATION RATE: 16 BRPM | HEIGHT: 70 IN | HEART RATE: 75 BPM | BODY MASS INDEX: 28.35 KG/M2

## 2018-12-04 DIAGNOSIS — J20.9 ACUTE BRONCHITIS, UNSPECIFIED ORGANISM: ICD-10-CM

## 2018-12-04 DIAGNOSIS — J06.9 ACUTE UPPER RESPIRATORY INFECTION: Primary | ICD-10-CM

## 2018-12-04 PROCEDURE — 99213 OFFICE O/P EST LOW 20 MIN: CPT | Performed by: FAMILY MEDICINE

## 2018-12-04 RX ORDER — AZITHROMYCIN 250 MG/1
TABLET, FILM COATED ORAL
Qty: 6 TABLET | Refills: 0 | Status: SHIPPED | OUTPATIENT
Start: 2018-12-04 | End: 2018-12-08

## 2018-12-04 RX ORDER — BENZONATATE 200 MG/1
200 CAPSULE ORAL 3 TIMES DAILY PRN
Qty: 30 CAPSULE | Refills: 0 | Status: SHIPPED | OUTPATIENT
Start: 2018-12-04 | End: 2020-02-06

## 2018-12-04 RX ORDER — METHYLPREDNISOLONE 4 MG/1
TABLET ORAL
Qty: 21 TABLET | Refills: 0 | Status: SHIPPED | OUTPATIENT
Start: 2018-12-04 | End: 2019-12-05 | Stop reason: ALTCHOICE

## 2018-12-04 NOTE — PROGRESS NOTES
[email or web address]        NAME: Reyes Robin is a 80 y o  male  : 1950    MRN: [id number]  DATE: 2018  TIME: 2:31 PM    Assessment and Plan   Acute upper respiratory infection [J06 9]  1  Acute upper respiratory infection  azithromycin (ZITHROMAX) 250 mg tablet   2  Acute bronchitis, unspecified organism  Methylprednisolone 4 MG TBPK    benzonatate (TESSALON) 200 MG capsule         Patient Instructions     Patient Instructions   Options discussed with patient  Z-Anmol as directed 2 initially then 1 daily until finished (please take probiotics)  Tessalon Perles 2 to 3 times a day (every 8-12 hours) as needed for cough  Tylenol as needed  Gargle and swish mouth with warm saltwater or mouthwash  If no improvement start Medrol Dosepak as directed (please take with food)  Recheck/follow-up with family physician  Please go to the hospital emergency department if needed  Follow up with PCP in 3-5 days  Proceed to  ER if symptoms worsen  Chief Complaint     Chief Complaint   Patient presents with    Cough     X1 WEEK         History of Present Illness       Congestion, sore throat, persistent cough since Thanksgiving        Review of Systems   Review of Systems   HENT: Positive for congestion and sore throat  Respiratory: Positive for cough  Cardiovascular: Negative  Musculoskeletal: Negative  Skin: Negative  Neurological: Negative            Current Medications       Current Outpatient Prescriptions:     atorvastatin (LIPITOR) 40 mg tablet, TAKE 1 TABLET BY MOUTH DAILY AT NIGHT, Disp: , Rfl:     Biotin 1 MG CAPS, Take 1 tablet by mouth, Disp: , Rfl:     Cholecalciferol 1000 units CHEW, Chew, Disp: , Rfl:     CIALIS 20 MG tablet, TAKE AS DIRECTED, Disp: 12 tablet, Rfl: 0    CITALOPRAM HYDROBROMIDE PO, Take by mouth, Disp: , Rfl:     clopidogrel (PLAVIX) 75 mg tablet, Take 75 mg by mouth daily, Disp: , Rfl: 1    Coenzyme Q10 (COQ10) 200 MG CAPS, Take by mouth, Disp: , Rfl:     diazepam (VALIUM) 5 mg tablet, TAKE 1 TABLET BY MOUTH DAILY AS NEEDED, Disp: , Rfl:     dutasteride (AVODART) 0 5 mg capsule, TAKE ONE CAPSULE BY MOUTH EVERY DAY, Disp: 90 capsule, Rfl: 2    GLUCOS-BG-MSM-DK-J-XPBV-SECU PO, Take by mouth, Disp: , Rfl:     Multiple Vitamins-Minerals (MULTIVITAMIN PO), Take by mouth, Disp: , Rfl:     Omega-3 Fatty Acids (FISH OIL) 1,000 mg, Take by mouth, Disp: , Rfl:     tamsulosin (FLOMAX) 0 4 mg, TAKE 1 CAPSULE TWICE A DAY, Disp: 180 capsule, Rfl: 3    albuterol (PROVENTIL HFA,VENTOLIN HFA) 90 mcg/act inhaler, Inhale 2 puffs every 4 (four) hours as needed for wheezing (Patient not taking: Reported on 9/28/2018 ), Disp: 1 Inhaler, Rfl: 0    aspirin 81 MG tablet, Take 81 mg by mouth, Disp: , Rfl:     azithromycin (ZITHROMAX) 250 mg tablet, Take 2 tablets today then 1 tablet daily x 4 days, Disp: 6 tablet, Rfl: 0    benzonatate (TESSALON) 200 MG capsule, Take 1 capsule (200 mg total) by mouth 3 (three) times a day as needed for cough for up to 30 doses, Disp: 30 capsule, Rfl: 0    clopidogrel (PLAVIX) 75 mg tablet, Take 75 mg by mouth, Disp: , Rfl:     Methylprednisolone 4 MG TBPK, Use as directed on package, Disp: 21 tablet, Rfl: 0    nitroglycerin (NITROSTAT) 0 4 mg SL tablet, Place 0 4 mg under the tongue, Disp: , Rfl:     Current Allergies     Allergies as of 12/04/2018    (No Known Allergies)            The following portions of the patient's history were reviewed and updated as appropriate: allergies, current medications, past family history, past medical history, past social history, past surgical history and problem list      Past Medical History:   Diagnosis Date    Abnormal serum cholesterol     Back strain     BPH (benign prostatic hyperplasia)     CAD (coronary artery disease)     Hyperlipidemia        Past Surgical History:   Procedure Laterality Date    ACHILLES TENDON REPAIR      CATARACT EXTRACTION Right     CORONARY ANGIOPLASTY WITH STENT PLACEMENT      KNEE SURGERY      NM COLONOSCOPY FLX DX W/COLLJ SPEC WHEN PFRMD N/A 10/23/2018    Procedure: COLONOSCOPY;  Surgeon: Abdiel Diaz MD;  Location[facility]; Service: Gastroenterology       Family History   Problem Relation Age of Onset    Colon cancer Mother     Prostate cancer Brother     Stroke Father          Medications have been verified  Objective   /58   Pulse 75   Temp 98 4 °F (36 9 °C)   Resp 16   Ht 5' 10" (1 778 m)   Wt 89 8 kg (198 lb)   SpO2 99%   BMI 28 41 kg/m²        Physical Exam     Physical Exam   Constitutional: He is oriented to person, place, and time  He appears well-developed and well-nourished  HENT:   Injection of the oropharynx; slight nasal congestion   Neck: Normal range of motion  Neck supple  Cardiovascular: Normal rate, regular rhythm and normal heart sounds  Pulmonary/Chest: Effort normal and breath sounds normal    Neurological: He is alert and oriented to person, place, and time  No nuchal rigidity   Skin: Skin is warm  Good color and turgor   Psychiatric: He has a normal mood and affect  His behavior is normal    Nursing note and vitals reviewed

## 2018-12-04 NOTE — PATIENT INSTRUCTIONS
Options discussed with patient  Z-Anmol as directed 2 initially then 1 daily until finished (please take probiotics)  Tessalon Perles 2 to 3 times a day (every 8-12 hours) as needed for cough  Tylenol as needed  Gargle and swish mouth with warm saltwater or mouthwash  If no improvement start Medrol Dosepak as directed (please take with food)  Recheck/follow-up with family physician  Please go to the hospital emergency department if needed

## 2019-04-09 DIAGNOSIS — N40.1 BENIGN PROSTATIC HYPERPLASIA WITH LOWER URINARY TRACT SYMPTOMS, SYMPTOM DETAILS UNSPECIFIED: ICD-10-CM

## 2019-04-09 RX ORDER — DUTASTERIDE 0.5 MG/1
CAPSULE, LIQUID FILLED ORAL
Qty: 90 CAPSULE | Refills: 2 | Status: SHIPPED | OUTPATIENT
Start: 2019-04-09 | End: 2020-01-13

## 2019-05-30 ENCOUNTER — OFFICE VISIT (OUTPATIENT)
Dept: UROLOGY | Facility: CLINIC | Age: 69
End: 2019-05-30
Payer: COMMERCIAL

## 2019-05-30 VITALS
HEART RATE: 66 BPM | BODY MASS INDEX: 29.92 KG/M2 | WEIGHT: 209 LBS | DIASTOLIC BLOOD PRESSURE: 64 MMHG | SYSTOLIC BLOOD PRESSURE: 130 MMHG | HEIGHT: 70 IN

## 2019-05-30 DIAGNOSIS — N52.9 ERECTILE DYSFUNCTION, UNSPECIFIED ERECTILE DYSFUNCTION TYPE: ICD-10-CM

## 2019-05-30 DIAGNOSIS — R97.20 ELEVATED PSA: ICD-10-CM

## 2019-05-30 DIAGNOSIS — R39.12 BENIGN PROSTATIC HYPERPLASIA WITH WEAK URINARY STREAM: ICD-10-CM

## 2019-05-30 DIAGNOSIS — N42.9 DISORDER OF PROSTATE, UNSPECIFIED: Primary | ICD-10-CM

## 2019-05-30 DIAGNOSIS — N40.1 BENIGN PROSTATIC HYPERPLASIA WITH WEAK URINARY STREAM: ICD-10-CM

## 2019-05-30 PROCEDURE — 99213 OFFICE O/P EST LOW 20 MIN: CPT | Performed by: UROLOGY

## 2019-06-10 DIAGNOSIS — N40.1 BPH WITH OBSTRUCTION/LOWER URINARY TRACT SYMPTOMS: ICD-10-CM

## 2019-06-10 DIAGNOSIS — N13.8 BPH WITH OBSTRUCTION/LOWER URINARY TRACT SYMPTOMS: ICD-10-CM

## 2019-06-10 RX ORDER — TAMSULOSIN HYDROCHLORIDE 0.4 MG/1
CAPSULE ORAL
Qty: 180 CAPSULE | Refills: 3 | Status: SHIPPED | OUTPATIENT
Start: 2019-06-10 | End: 2020-03-26

## 2019-12-05 ENCOUNTER — OFFICE VISIT (OUTPATIENT)
Dept: UROLOGY | Facility: CLINIC | Age: 69
End: 2019-12-05
Payer: COMMERCIAL

## 2019-12-05 VITALS
WEIGHT: 208.2 LBS | DIASTOLIC BLOOD PRESSURE: 74 MMHG | HEART RATE: 80 BPM | SYSTOLIC BLOOD PRESSURE: 132 MMHG | BODY MASS INDEX: 29.81 KG/M2 | HEIGHT: 70 IN

## 2019-12-05 DIAGNOSIS — R97.20 ELEVATED PSA: Primary | ICD-10-CM

## 2019-12-05 DIAGNOSIS — N42.9 DISORDER OF PROSTATE, UNSPECIFIED: ICD-10-CM

## 2019-12-05 PROCEDURE — 99213 OFFICE O/P EST LOW 20 MIN: CPT | Performed by: UROLOGY

## 2019-12-05 NOTE — PROGRESS NOTES
Progress Note - Urology  Richard Negron 77 y o  male MRN: [id number]  Encounter: [id number]      Chief Complaint:   Chief Complaint   Patient presents with    prostate disorder       HPI:     62-year-old male seen for follow-up evaluation of elevated PSA and prostatic outlet symptoms  His AUA score has diminished greatly on the combination dutasteride and tamsulosin  His current PSA is 5 9  Previous PSA was 4 45  He has had 3 previous biopsies the 1st in 2007 the 2nd in 2013 and the last in 2017  All of which were benign  He also had a consultation at [facility]  NMP MRI was done in showing low potential for clinically significant malignancy  He has improved dramatically from his urinary standpoint symptoms  He has no current UTI symptoms      MEDS:    Current Outpatient Medications:     albuterol (PROVENTIL HFA,VENTOLIN HFA) 90 mcg/act inhaler, Inhale 2 puffs every 4 (four) hours as needed for wheezing, Disp: 1 Inhaler, Rfl: 0    aspirin 81 MG tablet, Take 81 mg by mouth, Disp: , Rfl:     atorvastatin (LIPITOR) 40 mg tablet, TAKE 1 TABLET BY MOUTH DAILY AT NIGHT, Disp: , Rfl:     benzonatate (TESSALON) 200 MG capsule, Take 1 capsule (200 mg total) by mouth 3 (three) times a day as needed for cough for up to 30 doses, Disp: 30 capsule, Rfl: 0    Biotin 1 MG CAPS, Take 1 tablet by mouth, Disp: , Rfl:     Cholecalciferol 1000 units CHEW, Chew, Disp: , Rfl:     CIALIS 20 MG tablet, TAKE AS DIRECTED, Disp: 12 tablet, Rfl: 0    CITALOPRAM HYDROBROMIDE PO, Take by mouth, Disp: , Rfl:     clopidogrel (PLAVIX) 75 mg tablet, Take 75 mg by mouth daily, Disp: , Rfl: 1    Coenzyme Q10 (COQ10) 200 MG CAPS, Take by mouth, Disp: , Rfl:     diazepam (VALIUM) 5 mg tablet, TAKE 1 TABLET BY MOUTH DAILY AS NEEDED, Disp: , Rfl:     dutasteride (AVODART) 0 5 mg capsule, TAKE ONE CAPSULE BY MOUTH EVERY DAY, Disp: 90 capsule, Rfl: 2    GLUCOS-BG-MSM-XZ-I-CUKU-SECU PO, Take by mouth, Disp: , Rfl:     Multiple Vitamins-Minerals (MULTIVITAMIN PO), Take by mouth, Disp: , Rfl:     Omega-3 Fatty Acids (FISH OIL) 1,000 mg, Take by mouth, Disp: , Rfl:     tamsulosin (FLOMAX) 0 4 mg, TAKE 1 CAPSULE TWICE A DAY, Disp: 180 capsule, Rfl: 3    nitroglycerin (NITROSTAT) 0 4 mg SL tablet, Place 0 4 mg under the tongue, Disp: , Rfl:       PMH:  Past Medical History:   Diagnosis Date    Abnormal serum cholesterol     Back strain     BPH (benign prostatic hyperplasia)     CAD (coronary artery disease)     Hyperlipidemia          PSH  Past Surgical History:   Procedure Laterality Date    ACHILLES TENDON REPAIR      CATARACT EXTRACTION Right     CORONARY ANGIOPLASTY WITH STENT PLACEMENT      KNEE SURGERY      AL COLONOSCOPY FLX DX W/COLLJ SPEC WHEN PFRMD N/A 10/23/2018    Procedure: COLONOSCOPY;  Surgeon: Nora Bhat MD;  Location[facility]; Service: Gastroenterology         FH  Family History   Problem Relation Age of Onset    Colon cancer Mother     Prostate cancer Brother     Stroke Father         SH  Social History     Socioeconomic History    Marital status:      Spouse name: None    Number of children: None    Years of education: None    Highest education level: None   Occupational History    None   Social Needs    Financial resource strain: None    Food insecurity:     Worry: None     Inability: None    Transportation needs:     Medical: None     Non-medical: None   Tobacco Use    Smoking status: Former Smoker     Last attempt to quit: 10/5/1990     Years since quittin 1    Smokeless tobacco: Never Used   Substance and Sexual Activity    Alcohol use: Yes     Comment: occasionally, 2-3 per week       Drug use: No    Sexual activity: None   Lifestyle    Physical activity:     Days per week: None     Minutes per session: None    Stress: None   Relationships    Social connections:     Talks on phone: None     Gets together: None     Attends Voodoo service: None     Active member of club or organization: None     Attends meetings of clubs or organizations: None     Relationship status: None    Intimate partner violence:     Fear of current or ex partner: None     Emotionally abused: None     Physically abused: None     Forced sexual activity: None   Other Topics Concern    None   Social History Narrative    None          ROS:  Review of Systems      Vitals:  Blood pressure 132/74, pulse 80, height 5' 10" (1 778 m), weight 94 4 kg (208 lb 3 2 oz)  Physical Exam:     79-year-old male appearing stated age in no acute distress  There is no evidence of gynecomastia  The abdomen is protuberant but I do not appreciate a mass  The bladder is not distended  Genital structures show normal penis  Both testes are unremarkable no mass  Scrotum is unremarkable  On rectal examination sphincter tone is normal   The prostate is prominently enlarged  I cannot feel the base of the gland  No obvious nodules are appreciated       Lab, Imaging and other studies:  No results found for this or any previous visit (from the past 672 hour(s))  IMPRESSION:   1  Elevated PSA   2  Prostatic hypertrophy   3  Disorder the prostate unspecified    PLAN:   continue surveillance with INO and PSA  If continued elevation of the PSA I would consider a repeat multiparametric MRI      Please note :  Voice dictation software has been used to create this document  There may be inadvertent transcription errors

## 2020-01-13 DIAGNOSIS — N40.1 BENIGN PROSTATIC HYPERPLASIA WITH LOWER URINARY TRACT SYMPTOMS, SYMPTOM DETAILS UNSPECIFIED: ICD-10-CM

## 2020-01-13 RX ORDER — DUTASTERIDE 0.5 MG/1
CAPSULE, LIQUID FILLED ORAL
Qty: 90 CAPSULE | Refills: 0 | Status: SHIPPED | OUTPATIENT
Start: 2020-01-13 | End: 2020-03-26

## 2020-01-20 ENCOUNTER — TELEPHONE (OUTPATIENT)
Dept: UROLOGY | Facility: MEDICAL CENTER | Age: 70
End: 2020-01-20

## 2020-01-20 DIAGNOSIS — R31.9 HEMATURIA, UNSPECIFIED TYPE: Primary | ICD-10-CM

## 2020-01-20 NOTE — TELEPHONE ENCOUNTER
Patient of Kate/Carmelita  History elevated PSA and prostatic outlet symptoms  Last seen in office 12/5/19 by Dr Herman Larios  Ordered for continued surveillance with INO and PSA  If continued elevation of the PSA I would consider a repeat multiparametric MRI  Patient currently scheduled for 6 month follow up in June 2020  Call returned to patient, he states that he started with blood in urine at the start of his stream intermittently  This began after a "spin class" last Wednesday  He states that he takes daily Plavix which he stopped it for a few days  Hematuria started to resolve but has increased again since restarting Plavix yesterday  Advised patient to increase water intake  Will route to provider regarding plan and return call to patient

## 2020-01-20 NOTE — TELEPHONE ENCOUNTER
Patient call again  Stated he was disconnected while speaking to nurse  I stated to him that nurse is checking with the doctor on next steps; he should increase his water intake, and she will call him back  He understood

## 2020-01-20 NOTE — TELEPHONE ENCOUNTER
Patient seen by Dr Ravin Palma at [facility] office  Patient called to report hematuria   Would like a call to discuss at [phone]  Thank you

## 2020-01-21 NOTE — TELEPHONE ENCOUNTER
Patient called in and advised that he still did not get a call back yet and he would like to be seen as soon as possible  Please advise

## 2020-01-22 ENCOUNTER — APPOINTMENT (OUTPATIENT)
Dept: LAB | Facility: CLINIC | Age: 70
End: 2020-01-22
Payer: COMMERCIAL

## 2020-01-22 ENCOUNTER — TRANSCRIBE ORDERS (OUTPATIENT)
Dept: LAB | Facility: CLINIC | Age: 70
End: 2020-01-22

## 2020-01-22 DIAGNOSIS — R31.9 HEMATURIA, UNSPECIFIED TYPE: ICD-10-CM

## 2020-01-22 LAB
BACTERIA UR QL AUTO: ABNORMAL /HPF
BILIRUB UR QL STRIP: NEGATIVE
CLARITY UR: ABNORMAL
COLOR UR: YELLOW
GLUCOSE UR STRIP-MCNC: NEGATIVE MG/DL
HGB UR QL STRIP.AUTO: ABNORMAL
KETONES UR STRIP-MCNC: NEGATIVE MG/DL
LEUKOCYTE ESTERASE UR QL STRIP: NEGATIVE
NITRITE UR QL STRIP: NEGATIVE
NON-SQ EPI CELLS URNS QL MICRO: ABNORMAL /HPF
PH UR STRIP.AUTO: 5.5 [PH]
PROT UR STRIP-MCNC: NEGATIVE MG/DL
RBC #/AREA URNS AUTO: ABNORMAL /HPF
SP GR UR STRIP.AUTO: 1.02 (ref 1–1.03)
UROBILINOGEN UR QL STRIP.AUTO: 0.2 E.U./DL
WBC #/AREA URNS AUTO: ABNORMAL /HPF

## 2020-01-22 PROCEDURE — 81001 URINALYSIS AUTO W/SCOPE: CPT

## 2020-01-22 NOTE — TELEPHONE ENCOUNTER
This 73-year-old male with significant prostatomegaly and cardiovascular disease has been on Plavix  He recently enrolled in a "spinning class" he developed hematuria which is described as the initial portion of the stream  He is concerned that he felt feverish  He does not have dysuria  The blood is now intermittent  I advised him not to use a bike fracture size  I advised him to use a treadmill  I advised possible use of an elliptical   I advise also possible use of recumbent bike  He is on dutasteride and tamsulosin  He will call if the urine does not clear within the next several days    I also will check a urine for culture and microscopic

## 2020-01-23 NOTE — TELEPHONE ENCOUNTER
I spoke to Yissel Farah regarding his urinalysis  I recommended a CT scan and a cystoscopy which of course he was resistant to  I told I would for the information to you for your impression

## 2020-01-24 ENCOUNTER — TELEPHONE (OUTPATIENT)
Dept: UROLOGY | Facility: CLINIC | Age: 70
End: 2020-01-24

## 2020-01-24 NOTE — TELEPHONE ENCOUNTER
I called the patient this morning to discuss his current situation with the hematuria  It appears likely it is secondary to the anticoagulation therapy and his recent physical activity  However I do need to have him seen in the office sooner by me to determine if we will do a complete workup if the urine does not clear

## 2020-03-25 DIAGNOSIS — N13.8 BPH WITH OBSTRUCTION/LOWER URINARY TRACT SYMPTOMS: ICD-10-CM

## 2020-03-25 DIAGNOSIS — N40.1 BENIGN PROSTATIC HYPERPLASIA WITH LOWER URINARY TRACT SYMPTOMS, SYMPTOM DETAILS UNSPECIFIED: ICD-10-CM

## 2020-03-25 DIAGNOSIS — N40.1 BPH WITH OBSTRUCTION/LOWER URINARY TRACT SYMPTOMS: ICD-10-CM

## 2020-03-26 RX ORDER — DUTASTERIDE 0.5 MG/1
CAPSULE, LIQUID FILLED ORAL
Qty: 90 CAPSULE | Refills: 0 | Status: SHIPPED | OUTPATIENT
Start: 2020-03-26 | End: 2020-06-22 | Stop reason: SDUPTHER

## 2020-03-26 RX ORDER — TAMSULOSIN HYDROCHLORIDE 0.4 MG/1
CAPSULE ORAL
Qty: 180 CAPSULE | Refills: 3 | Status: SHIPPED | OUTPATIENT
Start: 2020-03-26 | End: 2021-04-26

## 2020-06-22 DIAGNOSIS — N40.1 BENIGN PROSTATIC HYPERPLASIA WITH LOWER URINARY TRACT SYMPTOMS, SYMPTOM DETAILS UNSPECIFIED: ICD-10-CM

## 2020-06-22 RX ORDER — DUTASTERIDE 0.5 MG/1
0.5 CAPSULE, LIQUID FILLED ORAL DAILY
Qty: 90 CAPSULE | Refills: 3 | Status: SHIPPED | OUTPATIENT
Start: 2020-06-22 | End: 2021-06-27

## 2020-06-29 ENCOUNTER — TELEMEDICINE (OUTPATIENT)
Dept: UROLOGY | Facility: CLINIC | Age: 70
End: 2020-06-29
Payer: COMMERCIAL

## 2020-06-29 ENCOUNTER — TELEPHONE (OUTPATIENT)
Dept: UROLOGY | Facility: CLINIC | Age: 70
End: 2020-06-29

## 2020-06-29 DIAGNOSIS — R31.29 MICROHEMATURIA: Primary | ICD-10-CM

## 2020-06-29 DIAGNOSIS — R97.20 ELEVATED PSA: Primary | ICD-10-CM

## 2020-06-29 PROCEDURE — 99213 OFFICE O/P EST LOW 20 MIN: CPT | Performed by: PHYSICIAN ASSISTANT

## 2021-03-10 DIAGNOSIS — Z23 ENCOUNTER FOR IMMUNIZATION: ICD-10-CM

## 2021-04-25 DIAGNOSIS — N13.8 BPH WITH OBSTRUCTION/LOWER URINARY TRACT SYMPTOMS: ICD-10-CM

## 2021-04-25 DIAGNOSIS — N40.1 BPH WITH OBSTRUCTION/LOWER URINARY TRACT SYMPTOMS: ICD-10-CM

## 2021-04-26 RX ORDER — TAMSULOSIN HYDROCHLORIDE 0.4 MG/1
CAPSULE ORAL
Qty: 180 CAPSULE | Refills: 3 | Status: SHIPPED | OUTPATIENT
Start: 2021-04-26 | End: 2022-04-25 | Stop reason: SDUPTHER

## 2021-06-15 ENCOUNTER — TELEPHONE (OUTPATIENT)
Dept: UROLOGY | Facility: MEDICAL CENTER | Age: 71
End: 2021-06-15

## 2021-06-15 DIAGNOSIS — R97.20 ELEVATED PSA: Primary | ICD-10-CM

## 2021-06-15 NOTE — TELEPHONE ENCOUNTER
Pt managed by Tai Saldaña PA-C,pt scheduled 7/1 he's requesting psa order faxed [facility] [phone]

## 2021-06-25 DIAGNOSIS — N40.1 BENIGN PROSTATIC HYPERPLASIA WITH LOWER URINARY TRACT SYMPTOMS, SYMPTOM DETAILS UNSPECIFIED: ICD-10-CM

## 2021-06-27 RX ORDER — DUTASTERIDE 0.5 MG/1
CAPSULE, LIQUID FILLED ORAL
Qty: 90 CAPSULE | Refills: 3 | Status: SHIPPED | OUTPATIENT
Start: 2021-06-27 | End: 2021-08-06 | Stop reason: SDUPTHER

## 2021-07-01 ENCOUNTER — OFFICE VISIT (OUTPATIENT)
Dept: UROLOGY | Facility: CLINIC | Age: 71
End: 2021-07-01
Payer: COMMERCIAL

## 2021-07-01 VITALS
DIASTOLIC BLOOD PRESSURE: 70 MMHG | BODY MASS INDEX: 29.09 KG/M2 | HEART RATE: 65 BPM | SYSTOLIC BLOOD PRESSURE: 130 MMHG | WEIGHT: 203.2 LBS | HEIGHT: 70 IN

## 2021-07-01 DIAGNOSIS — R97.20 ELEVATED PSA: Primary | ICD-10-CM

## 2021-07-01 PROCEDURE — 99213 OFFICE O/P EST LOW 20 MIN: CPT | Performed by: PHYSICIAN ASSISTANT

## 2021-07-01 NOTE — PROGRESS NOTES
UROLOGY PROGRESS NOTE   Patient Identifiers: Pamella Sapp (MRN [id number])  Date of Service: 7/1/2021    Subjective:    67-year-old man history of elevated PSA  He has been on dutasteride and tamsulosin for several years  Current PSA is 3 75  He has had 3 previous biopsies in 2007 2013 and 2017  He had a consultation at [facility] at 1 point having an multiparametric MRI showing a low potential for clinically significant malignancy  He has had intermittent urinary retention in the past   Those symptoms have improved although he still has some urinary frequency  He inquires about potential TURP in the future        Reason for visit:  Elevated PSA and prostate hypertrophy follow-up    Objective:     VITALS:    Vitals:    07/01/21 1302   BP: 130/70   Pulse: 65           LABS:  No results found for: HGB, HCT, WBC, PLT]    No results found for: NA, K, CL, CO2, BUN, CREATININE, CALCIUM, GLUCOSE]        INPATIENT MEDS:    Current Outpatient Medications:     aspirin 81 MG tablet, Take 81 mg by mouth, Disp: , Rfl:     atorvastatin (LIPITOR) 40 mg tablet, TAKE 1 TABLET BY MOUTH DAILY AT NIGHT, Disp: , Rfl:     CIALIS 20 MG tablet, TAKE AS DIRECTED, Disp: 12 tablet, Rfl: 0    citalopram (CeleXA) 20 mg tablet, Take 20 mg by mouth daily, Disp: , Rfl:     clopidogrel (PLAVIX) 75 mg tablet, Take 75 mg by mouth daily, Disp: , Rfl: 1    Coenzyme Q10 (COQ10) 200 MG CAPS, Take by mouth, Disp: , Rfl:     diazepam (VALIUM) 5 mg tablet, TAKE 1 TABLET BY MOUTH DAILY AS NEEDED, Disp: , Rfl:     dutasteride (AVODART) 0 5 mg capsule, TAKE 1 CAPSULE BY MOUTH EVERY DAY, Disp: 90 capsule, Rfl: 3    Multiple Vitamins-Minerals (MULTIVITAMIN PO), Take by mouth, Disp: , Rfl:     nitroglycerin (NITROSTAT) 0 4 mg SL tablet, Place 0 4 mg under the tongue, Disp: , Rfl:     Omega-3 Fatty Acids (FISH OIL) 1,000 mg, Take by mouth, Disp: , Rfl:     tamsulosin (FLOMAX) 0 4 mg, TAKE 1 CAPSULE BY MOUTH TWICE A DAY, Disp: 180 capsule, Rfl: 3    Biotin 1 MG CAPS, Take 1 tablet by mouth (Patient not taking: Reported on 7/1/2021), Disp: , Rfl:     Cholecalciferol 1000 units CHEW, Chew (Patient not taking: Reported on 7/1/2021), Disp: , Rfl:     GLUCOS-BG-MSM-TS-L-STSL-SECU PO, Take by mouth (Patient not taking: Reported on 7/1/2021), Disp: , Rfl:       Physical Exam:   /70 (BP Location: Left arm, Patient Position: Sitting, Cuff Size: Adult)   Pulse 65   Ht 5' 10" (1 778 m)   Wt 92 2 kg (203 lb 3 2 oz)   BMI 29 16 kg/m²   GEN: no acute distress    RESP: breathing comfortably with no accessory muscle use    ABD: soft, non-tender, non-distended   INCISION:    EXT: no significant peripheral edema   (Male): Penis uncircumcised, phallus normal, meatus patent  Testicles descended into scrotum bilaterally without masses nor tenderness  No inguinal hernias bilaterally  INO: Prostate is enlarged at 50 grams  The prostate is not boggy  The prostate is not tender  No nodules noted      RADIOLOGY:    none     Assessment:    1  Elevated PSA   2   Prostate hypertrophy     Plan:   - we discussed options of management including TURP  - follow-up in 1 year with PSA prior to visit  - next step would be repeat cystoscopy and transrectal ultrasound should he wish to consider a surgical intervention  -

## 2021-08-06 ENCOUNTER — NURSE TRIAGE (OUTPATIENT)
Dept: OTHER | Facility: OTHER | Age: 71
End: 2021-08-06

## 2021-08-06 DIAGNOSIS — N40.1 BENIGN PROSTATIC HYPERPLASIA WITH LOWER URINARY TRACT SYMPTOMS, SYMPTOM DETAILS UNSPECIFIED: ICD-10-CM

## 2021-08-06 RX ORDER — DUTASTERIDE 0.5 MG/1
0.5 CAPSULE, LIQUID FILLED ORAL DAILY
Qty: 90 CAPSULE | Refills: 3 | Status: SHIPPED | OUTPATIENT
Start: 2021-08-06 | End: 2021-10-07 | Stop reason: SDUPTHER

## 2021-08-06 NOTE — TELEPHONE ENCOUNTER
Reason for Disposition   [1] Prescription refill request for NON-ESSENTIAL medicine (i e , no harm to patient if med not taken) AND [2] triager unable to refill per department policy    Answer Assessment - Initial Assessment Questions  1  NAME of MEDICATION: "What medicine are you calling about?"      dutasteride (AVODART) 0 5 mg capsule   2  QUESTION: "What is your question?" (e g , medication refill, side effect)      "I moved on Tuesday and I packed everything up and I can not find any of my medications  I am concerned about the Avodart because I take that for BPH and I don't want to get acute retention "   3  PRESCRIBING HCP: "Who prescribed it?" Reason: if prescribed by specialist, call should be referred to that group  Chuy Winslow PA-C   4   SYMPTOMS: "Do you have any symptoms?"      Denies    Protocols used: MEDICATION QUESTION CALL-ADULT-

## 2021-08-06 NOTE — TELEPHONE ENCOUNTER
Regarding: Medication Refill  ----- Message from Zee Johnson sent at 8/6/2021 11:15 AM EDT -----  " I am out of my Avodart and I need to take it today   I get urinary Retention if I don't take it ""

## 2021-10-07 ENCOUNTER — NURSE TRIAGE (OUTPATIENT)
Dept: OTHER | Facility: OTHER | Age: 71
End: 2021-10-07

## 2021-10-07 DIAGNOSIS — N40.1 BENIGN PROSTATIC HYPERPLASIA WITH LOWER URINARY TRACT SYMPTOMS, SYMPTOM DETAILS UNSPECIFIED: ICD-10-CM

## 2021-10-07 RX ORDER — DUTASTERIDE 0.5 MG/1
0.5 CAPSULE, LIQUID FILLED ORAL DAILY
Qty: 7 CAPSULE | Refills: 0 | Status: SHIPPED | OUTPATIENT
Start: 2021-10-07 | End: 2022-02-17 | Stop reason: SDUPTHER

## 2021-10-11 DIAGNOSIS — N52.9 ERECTILE DYSFUNCTION, UNSPECIFIED ERECTILE DYSFUNCTION TYPE: ICD-10-CM

## 2021-10-11 RX ORDER — TADALAFIL 20 MG/1
20 TABLET ORAL AS NEEDED
Qty: 30 TABLET | Refills: 0 | Status: SHIPPED | OUTPATIENT
Start: 2021-10-11 | End: 2022-05-24 | Stop reason: SDUPTHER

## 2021-12-03 ENCOUNTER — IMMUNIZATIONS (OUTPATIENT)
Dept: FAMILY MEDICINE CLINIC | Facility: HOSPITAL | Age: 71
End: 2021-12-03

## 2021-12-03 DIAGNOSIS — Z23 ENCOUNTER FOR IMMUNIZATION: Primary | ICD-10-CM

## 2021-12-03 PROCEDURE — 91306 COVID-19 MODERNA VACC 0.25 ML BOOSTER: CPT

## 2021-12-03 PROCEDURE — 0064A COVID-19 MODERNA VACC 0.25 ML BOOSTER: CPT

## 2022-02-17 DIAGNOSIS — N40.1 BENIGN PROSTATIC HYPERPLASIA WITH LOWER URINARY TRACT SYMPTOMS, SYMPTOM DETAILS UNSPECIFIED: ICD-10-CM

## 2022-02-17 RX ORDER — DUTASTERIDE 0.5 MG/1
0.5 CAPSULE, LIQUID FILLED ORAL DAILY
Qty: 90 CAPSULE | Refills: 3 | Status: SHIPPED | OUTPATIENT
Start: 2022-02-17 | End: 2022-06-29

## 2022-04-25 DIAGNOSIS — N13.8 BPH WITH OBSTRUCTION/LOWER URINARY TRACT SYMPTOMS: ICD-10-CM

## 2022-04-25 DIAGNOSIS — N40.1 BPH WITH OBSTRUCTION/LOWER URINARY TRACT SYMPTOMS: ICD-10-CM

## 2022-04-26 RX ORDER — TAMSULOSIN HYDROCHLORIDE 0.4 MG/1
0.4 CAPSULE ORAL 2 TIMES DAILY
Qty: 180 CAPSULE | Refills: 2 | Status: SHIPPED | OUTPATIENT
Start: 2022-04-26

## 2022-05-24 DIAGNOSIS — N52.9 ERECTILE DYSFUNCTION, UNSPECIFIED ERECTILE DYSFUNCTION TYPE: ICD-10-CM

## 2022-05-24 RX ORDER — TADALAFIL 20 MG/1
20 TABLET ORAL AS NEEDED
Qty: 30 TABLET | Refills: 0 | Status: SHIPPED | OUTPATIENT
Start: 2022-05-24

## 2022-06-29 ENCOUNTER — HOSPITAL ENCOUNTER (EMERGENCY)
Facility: HOSPITAL | Age: 72
Discharge: HOME | End: 2022-06-29
Attending: EMERGENCY MEDICINE | Admitting: EMERGENCY MEDICINE
Payer: COMMERCIAL

## 2022-06-29 ENCOUNTER — APPOINTMENT (EMERGENCY)
Dept: CT IMAGING | Facility: HOSPITAL | Age: 72
End: 2022-06-29
Payer: COMMERCIAL

## 2022-06-29 VITALS
WEIGHT: 207.67 LBS | SYSTOLIC BLOOD PRESSURE: 167 MMHG | RESPIRATION RATE: 18 BRPM | HEART RATE: 80 BPM | BODY MASS INDEX: 29.73 KG/M2 | DIASTOLIC BLOOD PRESSURE: 74 MMHG | TEMPERATURE: 97.7 F | HEIGHT: 70 IN | OXYGEN SATURATION: 97 %

## 2022-06-29 DIAGNOSIS — R11.0 NAUSEA: ICD-10-CM

## 2022-06-29 DIAGNOSIS — H81.10: ICD-10-CM

## 2022-06-29 DIAGNOSIS — R42 VERTIGO: Primary | ICD-10-CM

## 2022-06-29 DIAGNOSIS — D33.3 SCHWANNOMA OF CRANIAL NERVE (HCC): ICD-10-CM

## 2022-06-29 LAB
2HR DELTA HS TROPONIN: 0 NG/L
ALBUMIN SERPL BCP-MCNC: 4.2 G/DL (ref 3.5–5)
ALP SERPL-CCNC: 58 U/L (ref 34–104)
ALT SERPL W P-5'-P-CCNC: 27 U/L (ref 7–52)
ANION GAP SERPL CALCULATED.3IONS-SCNC: 9 MMOL/L (ref 4–13)
AST SERPL W P-5'-P-CCNC: 20 U/L (ref 13–39)
ATRIAL RATE: 75 BPM
BASOPHILS # BLD AUTO: 0.03 THOUSANDS/ΜL (ref 0–0.1)
BASOPHILS NFR BLD AUTO: 0 % (ref 0–1)
BILIRUB SERPL-MCNC: 1 MG/DL (ref 0.2–1)
BUN SERPL-MCNC: 16 MG/DL (ref 5–25)
CALCIUM SERPL-MCNC: 9.5 MG/DL (ref 8.4–10.2)
CARDIAC TROPONIN I PNL SERPL HS: 6 NG/L
CARDIAC TROPONIN I PNL SERPL HS: 6 NG/L
CHLORIDE SERPL-SCNC: 106 MMOL/L (ref 96–108)
CO2 SERPL-SCNC: 26 MMOL/L (ref 21–32)
CREAT SERPL-MCNC: 1 MG/DL (ref 0.6–1.3)
EOSINOPHIL # BLD AUTO: 0.1 THOUSAND/ΜL (ref 0–0.61)
EOSINOPHIL NFR BLD AUTO: 1 % (ref 0–6)
ERYTHROCYTE [DISTWIDTH] IN BLOOD BY AUTOMATED COUNT: 13.1 % (ref 11.6–15.1)
GFR SERPL CREATININE-BSD FRML MDRD: 75 ML/MIN/1.73SQ M
GLUCOSE SERPL-MCNC: 128 MG/DL (ref 65–140)
HCT VFR BLD AUTO: 41.2 % (ref 36.5–49.3)
HGB BLD-MCNC: 13.9 G/DL (ref 12–17)
IMM GRANULOCYTES # BLD AUTO: 0.04 THOUSAND/UL (ref 0–0.2)
IMM GRANULOCYTES NFR BLD AUTO: 1 % (ref 0–2)
LYMPHOCYTES # BLD AUTO: 1.76 THOUSANDS/ΜL (ref 0.6–4.47)
LYMPHOCYTES NFR BLD AUTO: 21 % (ref 14–44)
MCH RBC QN AUTO: 30.2 PG (ref 26.8–34.3)
MCHC RBC AUTO-ENTMCNC: 33.7 G/DL (ref 31.4–37.4)
MCV RBC AUTO: 90 FL (ref 82–98)
MONOCYTES # BLD AUTO: 0.67 THOUSAND/ΜL (ref 0.17–1.22)
MONOCYTES NFR BLD AUTO: 8 % (ref 4–12)
NEUTROPHILS # BLD AUTO: 5.67 THOUSANDS/ΜL (ref 1.85–7.62)
NEUTS SEG NFR BLD AUTO: 69 % (ref 43–75)
NRBC BLD AUTO-RTO: 0 /100 WBCS
P AXIS: 50 DEGREES
PLATELET # BLD AUTO: 263 THOUSANDS/UL (ref 149–390)
PMV BLD AUTO: 9.7 FL (ref 8.9–12.7)
POTASSIUM SERPL-SCNC: 3.6 MMOL/L (ref 3.5–5.3)
PR INTERVAL: 152 MS
PROT SERPL-MCNC: 6.9 G/DL (ref 6.4–8.4)
QRS AXIS: -29 DEGREES
QRSD INTERVAL: 108 MS
QT INTERVAL: 396 MS
QTC INTERVAL: 434 MS
RBC # BLD AUTO: 4.6 MILLION/UL (ref 3.88–5.62)
SODIUM SERPL-SCNC: 141 MMOL/L (ref 135–147)
T WAVE AXIS: 53 DEGREES
VENTRICULAR RATE: 72 BPM
WBC # BLD AUTO: 8.27 THOUSAND/UL (ref 4.31–10.16)

## 2022-06-29 PROCEDURE — 96374 THER/PROPH/DIAG INJ IV PUSH: CPT

## 2022-06-29 PROCEDURE — 99285 EMERGENCY DEPT VISIT HI MDM: CPT

## 2022-06-29 PROCEDURE — 70450 CT HEAD/BRAIN W/O DYE: CPT

## 2022-06-29 PROCEDURE — 36415 COLL VENOUS BLD VENIPUNCTURE: CPT | Performed by: PHYSICIAN ASSISTANT

## 2022-06-29 PROCEDURE — 99284 EMERGENCY DEPT VISIT MOD MDM: CPT | Performed by: NURSE PRACTITIONER

## 2022-06-29 PROCEDURE — 80053 COMPREHEN METABOLIC PANEL: CPT | Performed by: PHYSICIAN ASSISTANT

## 2022-06-29 PROCEDURE — 93010 ELECTROCARDIOGRAM REPORT: CPT | Performed by: INTERNAL MEDICINE

## 2022-06-29 PROCEDURE — 85025 COMPLETE CBC W/AUTO DIFF WBC: CPT | Performed by: PHYSICIAN ASSISTANT

## 2022-06-29 PROCEDURE — G1004 CDSM NDSC: HCPCS

## 2022-06-29 PROCEDURE — 99285 EMERGENCY DEPT VISIT HI MDM: CPT | Performed by: PHYSICIAN ASSISTANT

## 2022-06-29 PROCEDURE — 84484 ASSAY OF TROPONIN QUANT: CPT | Performed by: PHYSICIAN ASSISTANT

## 2022-06-29 PROCEDURE — 93005 ELECTROCARDIOGRAM TRACING: CPT

## 2022-06-29 RX ORDER — MECLIZINE HCL 12.5 MG/1
25 TABLET ORAL ONCE
Status: COMPLETED | OUTPATIENT
Start: 2022-06-29 | End: 2022-06-29

## 2022-06-29 RX ORDER — ONDANSETRON 4 MG/1
4 TABLET, FILM COATED ORAL EVERY 6 HOURS
Qty: 12 TABLET | Refills: 0 | Status: SHIPPED | OUTPATIENT
Start: 2022-06-29

## 2022-06-29 RX ORDER — MECLIZINE HYDROCHLORIDE 25 MG/1
25 TABLET ORAL 3 TIMES DAILY PRN
Qty: 30 TABLET | Refills: 0 | Status: SHIPPED | OUTPATIENT
Start: 2022-06-29

## 2022-06-29 RX ORDER — ONDANSETRON 2 MG/ML
4 INJECTION INTRAMUSCULAR; INTRAVENOUS ONCE
Status: COMPLETED | OUTPATIENT
Start: 2022-06-29 | End: 2022-06-29

## 2022-06-29 RX ADMIN — ONDANSETRON 4 MG: 2 INJECTION INTRAMUSCULAR; INTRAVENOUS at 09:25

## 2022-06-29 RX ADMIN — MECLIZINE 25 MG: 12.5 TABLET ORAL at 09:26

## 2022-06-29 NOTE — ASSESSMENT & PLAN NOTE
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 in diagnosed in 2012 with his acute hearing loss  Given his normal exam at this time and his current vascular medication regime, he is deemed stable for D/C  Follow up w outpatient MRI, and ENT visit

## 2022-06-29 NOTE — CONSULTS
[address] Shea 1950, 80 y o  male MRN: [id number]  Unit/Bed#: ED 08 Encounter: [id number]    Consult to neurology  Consult performed by: NJ Oconnor  Consult ordered by: Desiree Cha PA-C        Reason for Consult / Principal Problem:  Vertigo  Hx and PE limited by:  None  Review of previous medical records was completed  Vertigo, benign paroxysmal  Assessment & Plan  Neurology is asked to see this 71-year-old right-hand-dominant gentleman who has a past medical history significant for CAD, including several stents placed 5+ years ago and dyslipidemia  He presents the emergency department approximately 845 this a m  After he had an acute onset of vertigo at home  In addition to the vertigo he had a brief episode of diaphoresis and he has had somewhat of a persistent nausea which is now beginning to improve  He had improvement with the vertigo with the meclizine given sometime earlier today  Now has a nonfocal neurologic exam     He has a known 1 4 mm enhancing lesion in the region of the right cochlea, whose differential diagnosis includes intralabyrinthine schwannoma, labyrinthitis, intralabyrinthine hemorrhage or facial nerve schwannoma, last imaged in 2018 at [facility] in diagnosed in 2012 with his acute hearing loss  Given his normal exam at this time and his current vascular medication regime, he is deemed stable for D/C  Follow up w outpatient MRI, and ENT visit  This patient can follow up with ENT as an outpatient in a month or so  He should have an MRI brain as well as IAC's with and without gadolinium before his ENT appointment  He should continue his cardiovascular meds of aspirin Plavix and statin as well  Does not need to see Neurology urgently can be seen p r n     This gentleman also follows generally with [facility] supposed to [facility]            HPI: [facility] is a right handed   y o  male who neurology is asked to see after he presented here to the ED approximately 845 this morning after having an acute episode of vertigo in his home  This patient has a history of significant CAD he is maintained on meds as well as has multiple stance  He reports that he called EMS after it had persisted for half an hour so  When he arrived here proximally 845 he reported that it was improved and after he had meclizine at 9:30 a m  He reports that it improved even further  He reports that this episode started at 7:30 a m  This morning abruptly  He had been up twice to void earlier during the night and had actually already awoken a feels well in all systems  t 7:15 a m , turned over in bed to reach for his phone and had no issues  When it started at 7:30 a m  He reports that the room was immediately spinning  He tried to sit up and he was able to and he had on nausea any also had an episode short lived of diaphoresis  He was able to walk without assistance or holding onto the wall to and from the bathroom  He reports that it was the nauseousness which persisted longer than the vertigo  He reports that he had an episode similar to this although lasted longer in the mid 80s and he has done well since then  He reports his brother and sister also have had episodes of vertigo  He is noted to have had multiple hypertensive blood pressure readings here in the ED ranging from 170 to 190 year so  ROS: 12 system cued query: At approximately 3 this afternoon this gentleman reports that he feels much better  He denies any headache  He has no vertigo at this time he does not report that he feels unsteady  He reports he has actually been walking to and from the bathroom here without and easy necessity or unsteadiness  He reports that the nausea is also improved as well    In addition to the previous episode of vertigo 30 or 40 years ago he also reports that he has hearing loss in his right ear felt to be possibly related to a 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  No chest pain shortness of breath or or palpitations, given his history of CAD with stents  He reports he otherwise feels well  Historical Information     Past Medical History:   Diagnosis Date    Abnormal serum cholesterol     Back strain     BPH (benign prostatic hyperplasia)     CAD (coronary artery disease)     HL (hearing loss)     Wears hearing aids    Hyperlipidemia      Past Surgical History:   Procedure Laterality Date    ACHILLES TENDON REPAIR      ADENOIDECTOMY      CATARACT EXTRACTION Right     CORONARY ANGIOPLASTY WITH STENT PLACEMENT      KNEE SURGERY      IL COLONOSCOPY FLX DX W/COLLJ SPEC WHEN PFRMD N/A 10/23/2018    Procedure: COLONOSCOPY;  Surgeon: Tramaine Gardner MD;  Location: [facility]; Service: Gastroenterology    TONSILLECTOMY         Social History :  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  He is a former smoker, little alcohol no recreationals  Family History:  He reports his brother and sister have also had vertigo on several occasions before  Family History   Problem Relation Age of Onset    Colon cancer Mother     Prostate cancer Brother     Stroke Father          No Known Allergies  Meds:all current active meds have been reviewed he had a dose of meclizine as well as Zofran here  His current meds at home include aspirin Plavix and statin  He is also on Celexa meds for BPH  But he is not on any antihypertensive meds  I have asked him to adopt a home blood pressure monitoring program and discussed the adoption of antihypertensives with either his PCP or his cardiologist     Scheduled Meds:  PRN Meds:       Physical Exam:   Objective   Vitals:Blood pressure 167/74, pulse 80, temperature 97 7 °F (36 5 °C), temperature source Oral, resp  rate 18, height 5' 10" (1 778 m), weight 94 2 kg (207 lb 10 8 oz), SpO2 97 %  ,Body mass index is 29 8 kg/m²  Patient was examined in emergency department bed there is no family present  General: alert, appears stated age and cooperative  Head: Normocephalic, without obvious abnormality, atraumatic  Oral exam: lips, mucosa, and tongue moist;   Neck: no carotid bruit,   Lungs: clear to auscultation ant  bilaterally  Heart: regular rate and rhythm, S1, S2 normal, no murmur appreciated,   Abdomen: soft, +BS    Extremities: atraumatic, no cyanosis or edema    Neurologic:   Mental status: Alert, oriented, thought content appropriate  CN Exam: MONICA, EOM's I, no nystagmus appreciated  VF full, Gaze conjugate No sensory or motor lateralizations (No PP on face), Hearing I AS, absent right ear, CNIX-XII I B  Motor: full power, age appropriate x 4 limbs  Sensory: intact  X 4 limbs, 4 mod inc lt, temp, vib and prop, (not PP tested)  Cerebellar: no past pointing or drift from traditional standing Romberg position, although he did have controlled sway  DTR's: Age appropriate, WNL; Plantars: downgoing  Gait: Fluid smooth, without being wide-based here in the ED  Lab Results:   I have personally reviewed pertinent reports  , CBC:   Results from last 7 days   Lab Units 06/29/22  0929   WBC Thousand/uL 8 27   RBC Million/uL 4 60   HEMOGLOBIN g/dL 13 9   HEMATOCRIT % 41 2   MCV fL 90   PLATELETS Thousands/uL 263   , BMP/CMP:   Results from last 7 days   Lab Units 06/29/22  0929   SODIUM mmol/L 141   POTASSIUM mmol/L 3 6   CHLORIDE mmol/L 106   CO2 mmol/L 26   BUN mg/dL 16   CREATININE mg/dL 1 00   CALCIUM mg/dL 9 5   AST U/L 20   ALT U/L 27   ALK PHOS U/L 58   EGFR ml/min/1 73sq m 75   , Vitamin B12:   , HgBA1C:   , TSH:   , Coagulation:   , Lipid Profile:        Imaging Studies: I have personally reviewed pertinent films in PACS and Note his CT scan here has no acute pathology he does have a degree of small vessel changes Um appreciated    Also reviewed the MRI from [facility] noting his right cochlear lesion  EEG, Echo, Pathology, and Other Studies: He last had a cardiac catheterization a year and half ago at [facility] he was noted to have an adequate ejection fraction without AFib at that time  Counseling / Coordination of Care  Total Critical Care time spent Sixty minutes excluding procedures, teaching and family updates  Dictation voice to text software has been used in the creation of this document  Please consider this in light of any contextual or grammatical errors

## 2022-06-29 NOTE — ED PROVIDER NOTES
History  Chief Complaint   Patient presents with    Dizziness     Pt states woke up this morning at around 0730 everything was spinning, nausea      The patient is a 67-year-old male with history of CAD and BPH who presents to the emergency department for evaluation of dizziness and nausea  The patient states he woke up around 7:30 a m , and he felt like the room was spinning  He states he had associated nausea  He reports he has been able to walk, although he does feel somewhat unsteady on his feet  He reports a very remote history of similar symptoms that he said took place in the 46s  The patient additionally reports that he has had workup in the past and was told he has a small schwannoma  He states that the room spinning has now largely resolved, however he has ongoing significant nausea  There is no associated chest pain or shortness of breath  He did not take anything for his symptoms prior to coming in  He additionally denies fever, chills, abdominal pain, vomiting, rash, headache or weakness  History provided by:  Patient   used: No    Dizziness  Associated symptoms: nausea    Associated symptoms: no chest pain, no diarrhea, no headaches, no shortness of breath and no vomiting        Prior to Admission Medications   Prescriptions Last Dose Informant Patient Reported? Taking?    Cholecalciferol 1000 units CHEW  Self Yes Yes   Sig: Chew   Coenzyme Q10 (COQ10) 200 MG CAPS  Self Yes Yes   Sig: Take by mouth   GLUCOS-BG-MSM-QN-O-GWIU-SECU PO  Self Yes Yes   Sig: Take by mouth   Multiple Vitamins-Minerals (MULTIVITAMIN PO)  Self Yes Yes   Sig: Take by mouth   Omega-3 Fatty Acids (FISH OIL) 1,000 mg  Self Yes Yes   Sig: Take by mouth   aspirin 81 MG tablet  Self Yes Yes   Sig: Take 81 mg by mouth   atorvastatin (LIPITOR) 40 mg tablet  Self Yes Yes   Sig: TAKE 1 TABLET BY MOUTH DAILY AT NIGHT   citalopram (CeleXA) 20 mg tablet   Yes Yes   Sig: Take 20 mg by mouth daily clopidogrel (PLAVIX) 75 mg tablet  Self Yes Yes   Sig: Take 75 mg by mouth daily   diazepam (VALIUM) 5 mg tablet  Self Yes Yes   Sig: TAKE 1 TABLET BY MOUTH DAILY AS NEEDED   dutasteride (AVODART) 0 5 mg capsule   No Yes   Sig: Take 1 capsule (0 5 mg total) by mouth daily   nitroglycerin (NITROSTAT) 0 4 mg SL tablet   Yes Yes   Sig: Place 0 4 mg under the tongue   tadalafil (Cialis) 20 MG tablet   No Yes   Sig: Take 1 tablet (20 mg total) by mouth as needed in the morning for erectile dysfunction  Take as directed  tamsulosin (FLOMAX) 0 4 mg   No Yes   Sig: Take 1 capsule (0 4 mg total) by mouth 2 (two) times a day      Facility-Administered Medications: None       Past Medical History:   Diagnosis Date    Abnormal serum cholesterol     Back strain     BPH (benign prostatic hyperplasia)     CAD (coronary artery disease)     HL (hearing loss)     Wears hearing aids    Hyperlipidemia        Past Surgical History:   Procedure Laterality Date    ACHILLES TENDON REPAIR      ADENOIDECTOMY      CATARACT EXTRACTION Right     CORONARY ANGIOPLASTY WITH STENT PLACEMENT      KNEE SURGERY      NH COLONOSCOPY FLX DX W/COLLJ SPEC WHEN PFRMD N/A 10/23/2018    Procedure: COLONOSCOPY;  Surgeon: Mari Marques MD;  Location: [facility]; Service: Gastroenterology    TONSILLECTOMY         Family History   Problem Relation Age of Onset    Colon cancer Mother     Prostate cancer Brother     Stroke Father      I have reviewed and agree with the history as documented  E-Cigarette/Vaping     E-Cigarette/Vaping Substances     Social History     Tobacco Use    Smoking status: Former Smoker     Types: Cigarettes     Quit date: 10/5/1990     Years since quittin 7    Smokeless tobacco: Never Used   Substance Use Topics    Alcohol use: Yes     Comment: occasionally, 2-3 per week   Drug use: No       Review of Systems   Constitutional: Negative for chills and fever  HENT: Negative for ear pain and sore throat  Eyes: Negative for redness and visual disturbance  Respiratory: Negative for cough, shortness of breath and wheezing  Cardiovascular: Negative for chest pain  Gastrointestinal: Positive for nausea  Negative for abdominal pain, diarrhea and vomiting  Genitourinary: Negative for dysuria and hematuria  Musculoskeletal: Negative for back pain, neck pain and neck stiffness  Skin: Negative for color change and rash  Neurological: Positive for dizziness  Negative for light-headedness and headaches  All other systems reviewed and are negative  Physical Exam  Physical Exam  Vitals and nursing note reviewed  Constitutional:       General: He is not in acute distress  Appearance: He is well-developed  He is not ill-appearing or toxic-appearing  HENT:      Head: Normocephalic and atraumatic  Eyes:      General: Lids are normal       Extraocular Movements:      Right eye: Nystagmus present  Left eye: Nystagmus present  Pupils: Pupils are equal, round, and reactive to light  Cardiovascular:      Rate and Rhythm: Normal rate and regular rhythm  Heart sounds: Normal heart sounds  Pulmonary:      Effort: Pulmonary effort is normal       Breath sounds: Normal breath sounds  Abdominal:      General: There is no distension  Palpations: Abdomen is soft  Tenderness: There is no abdominal tenderness  There is no guarding or rebound  Musculoskeletal:      Cervical back: Normal range of motion and neck supple  Skin:     General: Skin is warm and dry  Neurological:      Mental Status: He is alert and oriented to person, place, and time           Vital Signs  ED Triage Vitals   Temperature Pulse Respirations Blood Pressure SpO2   06/29/22 0843 06/29/22 0843 06/29/22 0843 06/29/22 0843 06/29/22 0843   97 7 °F (36 5 °C) 73 18 (!) 175/74 100 %      Temp Source Heart Rate Source Patient Position - Orthostatic VS BP Location FiO2 (%)   06/29/22 0843 06/29/22 1512 -- 06/29/22 1512 --   Oral Monitor  Right arm       Pain Score       06/29/22 0843       No Pain           Vitals:    06/29/22 1130 06/29/22 1230 06/29/22 1300 06/29/22 1512   BP: (!) 204/86 (!) 180/78 159/95 167/74   Pulse: 70 70 68 80         Visual Acuity  Visual Acuity    Flowsheet Row Most Recent Value   L Pupil Size (mm) 3   R Pupil Size (mm) 3          ED Medications  Medications   ondansetron (ZOFRAN) injection 4 mg (4 mg Intravenous Given 6/29/22 0925)   meclizine (ANTIVERT) tablet 25 mg (25 mg Oral Given 6/29/22 0926)       Diagnostic Studies  Results Reviewed     Procedure Component Value Units Date/Time    HS Troponin I 2hr [[id number]]  (Normal) Collected: 06/29/22 1134    Lab Status: Final result Specimen: Blood from Arm, Right Updated: 06/29/22 1219     hs TnI 2hr 6 ng/L      Delta 2hr hsTnI 0 ng/L     HS Troponin 0hr (reflex protocol) [[id number]]  (Normal) Collected: 06/29/22 0929    Lab Status: Final result Specimen: Blood from Arm, Right Updated: 06/29/22 1009     hs TnI 0hr 6 ng/L     Comprehensive metabolic panel [[id number]] Collected: 06/29/22 0929    Lab Status: Final result Specimen: Blood from Arm, Right Updated: 06/29/22 1000     Sodium 141 mmol/L      Potassium 3 6 mmol/L      Chloride 106 mmol/L      CO2 26 mmol/L      ANION GAP 9 mmol/L      BUN 16 mg/dL      Creatinine 1 00 mg/dL      Glucose 128 mg/dL      Calcium 9 5 mg/dL      AST 20 U/L      ALT 27 U/L      Alkaline Phosphatase 58 U/L      Total Protein 6 9 g/dL      Albumin 4 2 g/dL      Total Bilirubin 1 00 mg/dL      eGFR 75 ml/min/1 73sq m     Narrative:      Christie guidelines for Chronic Kidney Disease (CKD):     Stage 1 with normal or high GFR (GFR > 90 mL/min/1 73 square meters)    Stage 2 Mild CKD (GFR = 60-89 mL/min/1 73 square meters)    Stage 3A Moderate CKD (GFR = 45-59 mL/min/1 73 square meters)    Stage 3B Moderate CKD (GFR = 30-44 mL/min/1 73 square meters)    Stage 4 Severe CKD (GFR = 15-29 mL/min/1 73 square meters)    Stage 5 End Stage CKD (GFR <15 mL/min/1 73 square meters)  Note: GFR calculation is accurate only with a steady state creatinine    CBC and differential [[id number]] Collected: 06/29/22 0929    Lab Status: Final result Specimen: Blood from Arm, Right Updated: 06/29/22 0948     WBC 8 27 Thousand/uL      RBC 4 60 Million/uL      Hemoglobin 13 9 g/dL      Hematocrit 41 2 %      MCV 90 fL      MCH 30 2 pg      MCHC 33 7 g/dL      RDW 13 1 %      MPV 9 7 fL      Platelets 342 Thousands/uL      nRBC 0 /100 WBCs      Neutrophils Relative 69 %      Immat GRANS % 1 %      Lymphocytes Relative 21 %      Monocytes Relative 8 %      Eosinophils Relative 1 %      Basophils Relative 0 %      Neutrophils Absolute 5 67 Thousands/µL      Immature Grans Absolute 0 04 Thousand/uL      Lymphocytes Absolute 1 76 Thousands/µL      Monocytes Absolute 0 67 Thousand/µL      Eosinophils Absolute 0 10 Thousand/µL      Basophils Absolute 0 03 Thousands/µL                  CT head without contrast   Final Result by Blanca Velasco MD (06/29 1109)      No evidence of acute intracranial process  Chronic microangiopathy  Chronic left maxillary sinusitis  Workstation performed: [id number]         MRI brain w wo contrast    (Results Pending)              Procedures  ECG 12 Lead Documentation Only    Date/Time: 6/29/2022 5:48 PM  Performed by: Henrry Fermin PA-C  Authorized by: Cortney Gutierrez PA-C     Comments:      Normal sinus rhythm at 72  Normal axis  No acute ST-T changes  No previous EKG available for comparison  ED Course  ED Course as of 06/29/22 1752   Wed Jun 29, 2022   1008 NIH stroke scale of 0  Neuro exam is completely normal at this time  He is reporting minimal vertigo  He is ambulating back and forth to the bathroom without assistance  He is not a tPA candidate at this time  1018 Re-evaluated the patient    He a reported feeling better, so I attempted to do the [place]-Hallpike maneuver  However, the patient became to nauseous, and had to stop the test   Patient does have fatigable horizontal nystagmus with EOMs  1111 Symptoms not reproducible with Oli-Hallpike  Patient did report some increasing nausea but denies any vertigo  No nystagmus  [id number] Neurology will come to evaluate the patient  1211 Patient continues to remain asymptomatic of vertigo  He is ambulating back and forth to the bathroom  He does report some mild nausea symptoms, however he states they are significantly improved from when he came in    1307 Reached back out to Neurology  I was asked to reach out to the Neurology [facility]  Second message sent at this time  [id number] Neurology advised they will be down in 1 hour to see the patient  1350 I updated the patient  He was requesting something to eat, which I provided  He is aware that we are waiting for Neurology assessment  [id number] Neurology is at bedside  [id number] Neurology [facility] is reaching out to attending, however tentative plan is to send patient home with prescription for meclizine and outpatient MRI  Stroke Assessment     Row Name 06/29/22 1007             NIH Stroke Scale    Interval --      Level of Consciousness (1a ) 0      LOC Questions (1b ) 0      LOC Commands (1c ) 0      Best Gaze (2 ) 0      Visual (3 ) 0      Facial Palsy (4 ) 0      Motor Arm, Left (5a ) 0      Motor Arm, Right (5b ) 0      Motor Leg, Left (6a ) 0      Motor Leg, Right (6b ) 0      Limb Ataxia (7 ) 0      Sensory (8 ) 0      Best Language (9 ) 0      Dysarthria (10 ) 0      Extinction and Inattention (11 ) (Formerly Neglect) 0      Total 0              Flowsheet Row Most Recent Value   TPA Decision Options    TPA Decision Patient not a TPA candidate  Patient is not a candidate options Symptoms resolved/clearly non disabling                      SBIRT 22yo+    Flowsheet Row Most Recent Value   SBIRT (25 yo +)    In order to provide better care to our patients, we are screening all of our patients for alcohol and drug use  Would it be okay to ask you these screening questions? Yes Filed at: 06/29/2022 1120   Initial Alcohol Screen: US AUDIT-C     1  How often do you have a drink containing alcohol? 1 Filed at: 06/29/2022 1120   2  How many drinks containing alcohol do you have on a typical day you are drinking? 1 Filed at: 06/29/2022 1120   3b  FEMALE Any Age, or MALE 65+: How often do you have 4 or more drinks on one occassion? 1 Filed at: 06/29/2022 1120   Audit-C Score 3 Filed at: 06/29/2022 1120   HILDA: How many times in the past year have you    Used an illegal drug or used a prescription medication for non-medical reasons? Never Filed at: 06/29/2022 1120                    MDM  Number of Diagnoses or Management Options  Nausea: new and requires workup  Vertigo: new and requires workup  Diagnosis management comments: Patient presents for evaluation of dizziness with associated nausea that he woke up with this morning around 7:30 a m  Upon my initial assessment, the patient reports almost complete resolution of vertigo, however he continues to have nausea  He is ambulating back and forth to the bathroom without assistance  Differential includes but is not limited to BPPV versus menieres versus labyrinthitis versus arrhythmia versus dehydration versus CVA  On my initial exam, the patient is completely neurologically intact  He has an NIH Stroke Scale of 0  The patient mentioned he also has a history of schwannoma, and he is asking if this could potentially be related  Will do further workup at this time  Labs, imaging and EKG were ordered  Labs reviewed with no significant findings  CT of head did not show any acute findings  EKG is without acute ischemic change  Patient was given meclizine and Zofran, which she reported significant relief symptoms following    I did do [place]-Hallpike maneuver, however it did not reproduce symptoms  This was after he had gotten the medications  Patient did not have any significant vertigo while in the ED, but he did continue to complain of some mild nausea  Discussed the case with neurology who agreed to evaluate the patient  They ultimately evaluated the patient and recommended discharge home  They feel it is likely peripheral vertigo, however they are putting in an order for an outpatient MRI of brain  Additionally, they recommended the patient be prescribed Antivert and Zofran  They advised that patient take Antivert 2 times a day for 2-3 days and then as needed  I discussed this plan with the patient, and he is agreeable  On final re-evaluation, the patient states he is feeling much better  Vertigo is resolved  He continues to ambulate with a steady gait  He is tolerating p o  Intake without issue  We discussed strict return to ED precautions for any new or worsening symptoms  He was given information for follow-up  Patient is stable for discharge           Amount and/or Complexity of Data Reviewed  Clinical lab tests: ordered and reviewed  Tests in the radiology section of CPT®: ordered and reviewed  Decide to obtain previous medical records or to obtain history from someone other than the patient: yes  Review and summarize past medical records: yes  Discuss the patient with other providers: yes (Neurology, Dr Michael Santiago)    Risk of Complications, Morbidity, and/or Mortality  Presenting problems: low  Diagnostic procedures: low  Management options: low    Patient Progress  Patient progress: improved      Disposition  Final diagnoses:   Vertigo   Nausea     Time reflects when diagnosis was documented in both MDM as applicable and the Disposition within this note     Time User Action Codes Description Comment    6/29/2022 12:09 PM Cortney Becerra Add [R42] Vertigo     6/29/2022  3:38 PM Gracie Sampson Add [D33 3] Schwannoma of cranial nerve ([facility] [address] )     6/29/2022  3:38 PM Krysten Sampson Add [H81 10] Vertigo, benign paroxysmal     6/29/2022  3:42 PM Zaida Branham Add [R11 0] Nausea       ED Disposition     ED Disposition   Discharge    Condition   Stable    Date/Time   Wed Jun 29, 2022  3:43 PM    Comment   Jesus Nelllayneaden Renae 28 discharge to home/self care                 Follow-up Information     Follow up With Specialties Details Why Contact Info Additional Information    Justin Giordano DO Family Medicine Schedule an appointment as soon as possible for a visit  As needed [address]  [phone]       [facility], [place] Neurology, Nurse Practitioner Schedule an appointment as soon as possible for a visit   [facility] [phone]       [facility] Emergency Medicine  If symptoms worsen [address] [facility] [address] [facility], [address], [place], [place], [address]          Discharge Medication List as of 6/29/2022  3:44 PM      START taking these medications    Details   meclizine (ANTIVERT) 25 mg tablet Take 1 tablet (25 mg total) by mouth 3 (three) times a day as needed for dizziness, Starting Wed 6/29/2022, Normal      ondansetron (ZOFRAN) 4 mg tablet Take 1 tablet (4 mg total) by mouth every 6 (six) hours, Starting Wed 6/29/2022, Normal         CONTINUE these medications which have NOT CHANGED    Details   aspirin 81 MG tablet Take 81 mg by mouth, Starting Wed 7/5/2017, Until Wed 6/29/2022 at 2359, Historical Med      atorvastatin (LIPITOR) 40 mg tablet TAKE 1 TABLET BY MOUTH DAILY AT NIGHT, Historical Med      Cholecalciferol 1000 units CHEW Chew, Historical Med      citalopram (CeleXA) 20 mg tablet Take 20 mg by mouth daily, Starting Fri 12/27/2019, Historical Med      clopidogrel (PLAVIX) 75 mg tablet Take 75 mg by mouth daily, Starting Thu 10/11/2018, Historical Med Coenzyme Q10 (COQ10) 200 MG CAPS Take by mouth, Historical Med      diazepam (VALIUM) 5 mg tablet TAKE 1 TABLET BY MOUTH DAILY AS NEEDED, Historical Med      dutasteride (AVODART) 0 5 mg capsule Take 1 capsule (0 5 mg total) by mouth daily, Starting Thu 2/17/2022, Until Wed 6/29/2022, Normal      GLUCOS-BG-MSM-AL-S-WWCV-SECU PO Take by mouth, Historical Med      Multiple Vitamins-Minerals (MULTIVITAMIN PO) Take by mouth, Historical Med      nitroglycerin (NITROSTAT) 0 4 mg SL tablet Place 0 4 mg under the tongue, Starting Wed 7/5/2017, Until Wed 6/29/2022 at 2359, Historical Med      Omega-3 Fatty Acids (FISH OIL) 1,000 mg Take by mouth, Historical Med      tadalafil (Cialis) 20 MG tablet Take 1 tablet (20 mg total) by mouth as needed in the morning for erectile dysfunction  Take as directed , Starting Tue 5/24/2022, Normal      tamsulosin (FLOMAX) 0 4 mg Take 1 capsule (0 4 mg total) by mouth 2 (two) times a day, Starting Tue 4/26/2022, Normal             Outpatient Discharge Orders   MRI brain w wo contrast   Standing Status: Future Standing Exp   Date: 06/29/26       PDMP Review     None          ED Provider  Electronically Signed by           Pro Sun PA-C  06/29/22 3516

## 2022-09-10 ENCOUNTER — HOSPITAL ENCOUNTER (OUTPATIENT)
Dept: MRI IMAGING | Facility: HOSPITAL | Age: 72
Discharge: HOME | End: 2022-09-10
Attending: OTOLARYNGOLOGY
Payer: COMMERCIAL

## 2022-09-10 DIAGNOSIS — R42 VERTIGO: ICD-10-CM

## 2022-09-10 DIAGNOSIS — D33.3 UNILATERAL VESTIBULAR SCHWANNOMA (HCC): ICD-10-CM

## 2022-09-10 PROCEDURE — 70553 MRI BRAIN STEM W/O & W/DYE: CPT

## 2022-09-10 PROCEDURE — A9585 GADOBUTROL INJECTION: HCPCS | Performed by: OTOLARYNGOLOGY

## 2022-09-10 PROCEDURE — G1004 CDSM NDSC: HCPCS

## 2022-09-10 RX ADMIN — GADOBUTROL 9 ML: 604.72 INJECTION INTRAVENOUS at 16:18

## 2022-12-19 ENCOUNTER — TELEPHONE (OUTPATIENT)
Dept: UROLOGY | Facility: MEDICAL CENTER | Age: 72
End: 2022-12-19

## 2022-12-19 DIAGNOSIS — R97.20 ELEVATED PSA: Primary | ICD-10-CM

## 2022-12-19 NOTE — TELEPHONE ENCOUNTER
Patient seen by Jenny Laboy at [facility]    Patient called to schedule follow up appointment   Patient scheduled for 01/24/23 at 3 pm with Jenny Laboy  Patient will need psa order mail to his home  He goes to [facility]      Patient can be reached at [phone]

## 2023-01-16 DIAGNOSIS — N40.1 BPH WITH OBSTRUCTION/LOWER URINARY TRACT SYMPTOMS: ICD-10-CM

## 2023-01-16 DIAGNOSIS — N13.8 BPH WITH OBSTRUCTION/LOWER URINARY TRACT SYMPTOMS: ICD-10-CM

## 2023-01-17 RX ORDER — TAMSULOSIN HYDROCHLORIDE 0.4 MG/1
CAPSULE ORAL
Qty: 180 CAPSULE | Refills: 2 | Status: SHIPPED | OUTPATIENT
Start: 2023-01-17

## 2023-01-24 ENCOUNTER — OFFICE VISIT (OUTPATIENT)
Dept: UROLOGY | Facility: CLINIC | Age: 73
End: 2023-01-24

## 2023-01-24 VITALS
HEIGHT: 70 IN | SYSTOLIC BLOOD PRESSURE: 136 MMHG | DIASTOLIC BLOOD PRESSURE: 82 MMHG | BODY MASS INDEX: 29.49 KG/M2 | WEIGHT: 206 LBS

## 2023-01-24 DIAGNOSIS — R97.20 ELEVATED PSA: Primary | ICD-10-CM

## 2023-01-24 DIAGNOSIS — N40.1 BENIGN PROSTATIC HYPERPLASIA WITH LOWER URINARY TRACT SYMPTOMS, SYMPTOM DETAILS UNSPECIFIED: ICD-10-CM

## 2023-01-24 RX ORDER — DUTASTERIDE 0.5 MG/1
0.5 CAPSULE, LIQUID FILLED ORAL DAILY
Qty: 90 CAPSULE | Refills: 3 | Status: SHIPPED | OUTPATIENT
Start: 2023-01-24 | End: 2023-04-24

## 2023-01-24 NOTE — PROGRESS NOTES
UROLOGY PROGRESS NOTE   Patient Identifiers: Dang Rodriguez (MRN [id number])  Date of Service: 1/24/2023    Subjective:   80-year-old man with history of elevated PSA  He has been on dutasteride and tamsulosin  His current PSA is 2 0  He has had at least 3 biopsies in 2007, 2013 and 2017  He had a consultation at [facility] and had a multi parametric MRI which showed a PI-RADS 2  He has some lower tract symptoms and occasional slow flow feeling of not fully emptying    We have discussed TURP in the past   Prior cystoscopy by Dr Trang Sharif showed bilateral hypertrophy with prominent median tissue        Objective:     VITALS:    Vitals:    01/24/23 1521   BP: 136/82           LABS:  Lab Results   Component Value Date    HGB 13 9 06/29/2022    HCT 41 2 06/29/2022    WBC 8 27 06/29/2022     06/29/2022   ]    Lab Results   Component Value Date    K 3 6 06/29/2022     06/29/2022    CO2 26 06/29/2022    BUN 16 06/29/2022    CREATININE 1 00 06/29/2022    CALCIUM 9 5 06/29/2022   ]        INPATIENT MEDS:    Current Outpatient Medications:   •  albuterol (PROVENTIL HFA,VENTOLIN HFA) 90 mcg/act inhaler, INHALE 1-2 PUFFS EVERY 6 HOURS AS NEEDED FOR WHEEZING , Disp: , Rfl:   •  atorvastatin (LIPITOR) 40 mg tablet, TAKE 1 TABLET BY MOUTH DAILY AT NIGHT, Disp: , Rfl:   •  benzonatate (TESSALON) 200 MG capsule, TAKE 1 CAPSULE BY MOUTH THREE TIMES A DAY AS NEEDED FOR COUGH, Disp: , Rfl:   •  Cholecalciferol 1000 units CHEW, Chew, Disp: , Rfl:   •  citalopram (CeleXA) 20 mg tablet, Take 20 mg by mouth daily, Disp: , Rfl:   •  clopidogrel (PLAVIX) 75 mg tablet, Take 75 mg by mouth daily, Disp: , Rfl: 1  •  Coenzyme Q10 (COQ10) 200 MG CAPS, Take by mouth, Disp: , Rfl:   •  diazepam (VALIUM) 5 mg tablet, TAKE 1 TABLET BY MOUTH DAILY AS NEEDED, Disp: , Rfl:   •  DULoxetine (CYMBALTA) 30 mg delayed release capsule, Take 30 mg by mouth, Disp: , Rfl:   •  dutasteride (AVODART) 0 5 mg capsule, Take 1 capsule (0 5 mg total) by mouth daily, Disp: 90 capsule, Rfl: 3  •  GLUCOS-BG-MSM-KR-B-JWYT-SECU PO, Take by mouth, Disp: , Rfl:   •  meclizine (ANTIVERT) 25 mg tablet, Take 1 tablet (25 mg total) by mouth 3 (three) times a day as needed for dizziness, Disp: 30 tablet, Rfl: 0  •  Multiple Vitamins-Minerals (MULTIVITAMIN PO), Take by mouth, Disp: , Rfl:   •  Omega-3 Fatty Acids (FISH OIL) 1,000 mg, Take by mouth, Disp: , Rfl:   •  ondansetron (ZOFRAN) 4 mg tablet, Take 1 tablet (4 mg total) by mouth every 6 (six) hours, Disp: 12 tablet, Rfl: 0  •  tadalafil (Cialis) 20 MG tablet, Take 1 tablet (20 mg total) by mouth as needed in the morning for erectile dysfunction  Take as directed , Disp: 30 tablet, Rfl: 0  •  tamsulosin (FLOMAX) 0 4 mg, TAKE 1 CAPSULE BY MOUTH 2 TIMES A DAY , Disp: 180 capsule, Rfl: 2  •  aspirin 81 MG tablet, Take 81 mg by mouth, Disp: , Rfl:   •  nitroglycerin (NITROSTAT) 0 4 mg SL tablet, Place 0 4 mg under the tongue, Disp: , Rfl:       Physical Exam:   /82 (BP Location: Left arm, Patient Position: Sitting, Cuff Size: Adult)   Ht 5' 10" (1 778 m)   Wt 93 4 kg (206 lb)   BMI 29 56 kg/m²   GEN: no acute distress    RESP: breathing comfortably with no accessory muscle use    ABD: soft, non-tender, non-distended   INCISION:    EXT: no significant peripheral edema   (Male): Penis circumcised, phallus normal, meatus patent  Testicles descended into scrotum bilaterally without masses nor tenderness  No inguinal hernias bilaterally  INO: Prostate is enlarged at 50 grams  The prostate is not boggy  The prostate is not tender  No nodules noted    RADIOLOGY:   None    Assessment:   #1  Elevated PSA  #2    Prostate hypertrophy    Plan:   -Follow-up in 1 year with PSA prior to visit  -Discussed repeat cystoscopy and TRUS when he is  -  -

## 2023-02-20 ENCOUNTER — OFFICE VISIT (OUTPATIENT)
Dept: AUDIOLOGY | Age: 73
End: 2023-02-20

## 2023-02-20 DIAGNOSIS — R42 DIZZINESS: Primary | ICD-10-CM

## 2023-02-20 DIAGNOSIS — R42 VERTIGO: ICD-10-CM

## 2023-02-20 NOTE — PROGRESS NOTES
Videonystagmography (VNG) Evaluation    Name:  Precious Mack  :  1950  Age:  71 y o  Date of Evaluation: 23     History: Dizziness  Reason for visit: Precious Mack is seen today at the request of Dr Lalo Cornell for an evaluation of balance  Patient complains of spinning and loss of balance lasting hours made worse by moving and anxiety  He also has suddenly lost hearing in his right ear twice and the ENTs are looking into a possible schwannomas  Otoscopy:   Right: clear   Left: small amount of wax in the middle of the ear canal    Tympanometry:   Right: Type Ad - hypermobile compliance   Left: Type A - normal middle ear pressure and compliance    Oculomotor battery:    Gaze:          Right: Normal        Left: Normal         Up: Normal        Down: Normal      Tracking: Normal    Saccades: Normal     Optokinetic: Normal    Positioning/Positionals:     [employer]:    Right: Negative      Left: Negative  , 4 LB  4UB      Positionals:     Sitting: Abnormal, LB nystagmus    Supine: Normal, 4 UB    Head Right: Normal    Head Left: Normal, LB      Calorics:     DNT due to a small amount of wax in the left ear      Recommendations: Consider vestibular physcial therapy evaluation and rehabilitation through [facility]  Continue VNG at later date  Follow up with managing physician        Daylin Zamudio    Clinical Audiologist

## 2023-03-14 ENCOUNTER — OFFICE VISIT (OUTPATIENT)
Dept: AUDIOLOGY | Age: 73
End: 2023-03-14

## 2023-03-14 DIAGNOSIS — D36.10 SCHWANNOMA: ICD-10-CM

## 2023-03-14 DIAGNOSIS — R42 DIZZINESS: Primary | ICD-10-CM

## 2023-03-14 NOTE — PROGRESS NOTES
Progress Note    Name:  Amy Terrazas  :  1950  Age:  71 y o  Date of Evaluation: 23     Electrocochleography (EcochG):      The EcochG was performed today utilizing a 90 dB nHL click stimulus via a gold tiptrode       Unable to obtain sufficient results in the right ear due to the severity of hearing loss  This is expected due to his severe to profound hearing loss      The average ratio of the height of the summating potential (SP) versus the action potential (AP) for the left ear was  38, which is considered normal  (A significant finding (abnormal) SP/AP amplitudes are exceeding a ratio of 0 53 (53%) as the critical value (Raiza et al , 2009)   Repeatability was fair due to patient movement      Interpretation:  Normal EcochG        Daylin Warner     Clinical Audiologist

## 2023-03-14 NOTE — PROGRESS NOTES
Videonystagmography (VNG) Evaluation    Name:  Kaci Argueta  :  1950  Age:  71 y o  Date of Evaluation: 23     History: Dizziness  Reason for visit: Kaci Argueta is seen today for calorics only  Calorics:     Bithermal Caloric Irrigation: Abnormal Unilateral Weakness Left  and 41%%    Recommendations: Consider vestibular physcial therapy evaluation and rehabilitation through [facility] [place] [facility]"" - [facility]  Follow up with managing physician        Daylin Cottrell    Clinical Audiologist

## 2023-08-23 ENCOUNTER — HOSPITAL ENCOUNTER (OUTPATIENT)
Dept: RADIOLOGY | Age: 73
Discharge: HOME | End: 2023-08-23
Payer: COMMERCIAL

## 2023-08-23 DIAGNOSIS — C73 PAPILLARY THYROID CARCINOMA (HCC): ICD-10-CM

## 2023-08-23 PROCEDURE — 76536 US EXAM OF HEAD AND NECK: CPT

## 2023-10-05 NOTE — PRE-PROCEDURE INSTRUCTIONS
Pre-Surgery Instructions:   Medication Instructions   • atorvastatin (LIPITOR) 40 mg tablet Take night before surgery   • Cholecalciferol 1000 units CHEW Stop taking 7 days prior to surgery. • citalopram (CeleXA) 20 mg tablet Take day of surgery. • clopidogrel (PLAVIX) 75 mg tablet Instructions provided by MD. Last dose 10/5   • Coenzyme Q10 (COQ10) 200 MG CAPS Stop taking 7 days prior to surgery. • diazepam (VALIUM) 5 mg tablet Take day of surgery. • dutasteride (AVODART) 0.5 mg capsule Take day of surgery. • GLUCOS-BG-MSM-EE-T-FPYU-SECU PO Stop taking 7 days prior to surgery. • hydrOXYzine HCL (ATARAX) 25 mg tablet Take day of surgery   • meclizine (ANTIVERT) 25 mg tablet Uses PRN- OK to take day of surgery   • Multiple Vitamins-Minerals (MULTIVITAMIN PO) Stop taking 7 days prior to surgery. • ondansetron (ZOFRAN) 4 mg tablet Uses PRN- OK to take day of surgery   • tamsulosin (FLOMAX) 0.4 mg Take day of surgery. Medication instructions for day surgery reviewed. Please use only a sip of water to take your instructed medications. Avoid all over the counter vitamins, supplements and NSAIDS for one week prior to surgery per anesthesia guidelines. Tylenol is ok to take as needed. You will receive a call one business day prior to surgery with an arrival time and hospital directions. If your surgery is scheduled on a Monday, the hospital will be calling you on the Friday prior to your surgery. If you have not heard from anyone by 8pm, please call the hospital supervisor through the hospital  at [phone]. Sergio Horne [phone]). Do not eat or drink anything after midnight the night before your surgery, including candy, mints, lifesavers, or chewing gum. Do not drink alcohol 24hrs before your surgery. Try not to smoke at least 24hrs before your surgery.        Follow the pre surgery showering instructions as listed in the [facility] Surgical Experience Booklet” or otherwise provided by your surgeon's office. Do not shave the surgical area 24 hours before surgery. Do not apply any lotions, creams, including makeup, cologne, deodorant, or perfumes after showering on the day of your surgery. No contact lenses, eye make-up, or artificial eyelashes. Remove nail polish, including gel polish, and any artificial, gel, or acrylic nails if possible. Remove all jewelry including rings and body piercing jewelry. Wear causal clothing that is easy to take on and off. Consider your type of surgery. Keep any valuables, jewelry, piercings at home. Please bring any specially ordered equipment (sling, braces) if indicated. Arrange for a responsible person to drive you to and from the hospital on the day of your surgery. Visitor Guidelines discussed. Call the surgeon's office with any new illnesses, exposures, or additional questions prior to surgery. Please reference your [facility] Surgical Experience Booklet” for additional information to prepare for your upcoming surgery.

## 2023-10-06 DIAGNOSIS — N40.1 BPH WITH OBSTRUCTION/LOWER URINARY TRACT SYMPTOMS: ICD-10-CM

## 2023-10-06 DIAGNOSIS — N13.8 BPH WITH OBSTRUCTION/LOWER URINARY TRACT SYMPTOMS: ICD-10-CM

## 2023-10-06 DIAGNOSIS — N40.1 BENIGN PROSTATIC HYPERPLASIA WITH LOWER URINARY TRACT SYMPTOMS, SYMPTOM DETAILS UNSPECIFIED: ICD-10-CM

## 2023-10-06 RX ORDER — DUTASTERIDE 0.5 MG/1
0.5 CAPSULE, LIQUID FILLED ORAL DAILY
Qty: 90 CAPSULE | Refills: 0 | Status: SHIPPED | OUTPATIENT
Start: 2023-10-06 | End: 2024-01-04

## 2023-10-06 RX ORDER — TAMSULOSIN HYDROCHLORIDE 0.4 MG/1
CAPSULE ORAL
Qty: 90 CAPSULE | Refills: 0 | Status: SHIPPED | OUTPATIENT
Start: 2023-10-06

## 2023-10-06 NOTE — TELEPHONE ENCOUNTER
Reason for call:   [x] Refill   [] Prior Auth  [] Other:     Office:   [] PCP/Provider -   [x] Speciality/Provider - [facility]    Medication:     Dutasteride  Tamsulosin    Dose/Frequency:    0.5mg 1 daily  0.4mg 1 BID    Quantity:     90  180    Pharmacy: [id number]    Does the patient have enough for 3 days? [] Yes   [x] No - Send as HP to POD    Patient had to extend his vacation in [place] and is completely out of meds.

## 2023-10-10 ENCOUNTER — ANESTHESIA EVENT (OUTPATIENT)
Dept: PERIOP | Facility: HOSPITAL | Age: 73
End: 2023-10-10
Payer: COMMERCIAL

## 2023-10-10 NOTE — ANESTHESIA PREPROCEDURE EVALUATION
Procedure:  TOTAL THYROIDECTOMY WITH NIMS (Neck)    Relevant Problems   CARDIO   (+) CAD (coronary artery disease)   (+) HTN (hypertension)   (+) Hyperlipidemia      /RENAL   (+) BPH with obstruction/lower urinary tract symptoms      NEURO/PSYCH   (+) Anxiety      Other   (+) S/P drug eluting coronary stent placement   11/18/2020 Cardiac Cath: Continued patency of previously placed LAD stent. Persistent nonocclusive disease in mid LAD. Widely patent previously placed stent in intermediate ramus. Mild disease distal left PDA. Abnormal left ventricular contractile pattern with mild anterior lateral hypokinesia and low normal ejection fraction      Left Ventricle: Systolic function is normal with an ejection fraction   of 60-65%. There is grade I (mild) diastolic dysfunction and normal left   atrial pressure. Left Atrium: Left atrium cavity is mildly dilated. Mitral Valve: There is trace regurgitation. Tricuspid Valve: There is trace regurgitation. The right ventricular   systolic pressure is normal.     Left Ventricle   Left ventricle is normal in size. Wall thickness is normal. Systolic function is normal with an ejection fraction of 60-65%. Wall motion is within normal limits. There is grade I (mild) diastolic dysfunction and normal left atrial pressure. Right Ventricle   Right ventricle cavity is normal. Systolic function is normal.     Left Atrium   Left atrium cavity is mildly dilated. Right Atrium   Right atrium cavity is normal.     IVC/SVC   IVC not well visualized. Mitral Valve   Mitral valve structure is normal. There is trace regurgitation. There is no evidence of mitral valve stenosis. Tricuspid Valve   Tricuspid valve structure is normal. There is trace regurgitation. There is no evidence of tricuspid valve stenosis. The right ventricular systolic pressure is normal.     Aortic Valve   The aortic valve is trileaflet. There is no regurgitation or stenosis.      Pulmonic Valve Pulmonic valve structure is normal. There is no regurgitation or stenosis. The estimated pulmonary artery systolic pressure is 69.8 mmHg. Ascending Aorta   The aorta appears normal in size. Pericardium   There is no pericardial effusion. Physical Exam    Airway    Mallampati score: III  TM Distance: >3 FB  Neck ROM: full     Dental   Comment: Multiple implants     Cardiovascular  Rhythm: regular, Rate: normal    Pulmonary   Breath sounds clear to auscultation    Other Findings        Anesthesia Plan  ASA Score- 2     Anesthesia Type- general with ASA Monitors. Additional Monitors:     Airway Plan: ETT. Plan Factors-Exercise tolerance (METS): >4 METS. Chart reviewed. Existing labs reviewed. Patient summary reviewed. Patient is not a current smoker. Induction- intravenous. Postoperative Plan- Plan for postoperative opioid use. Planned trial extubation    Informed Consent- Anesthetic plan and risks discussed with patient. I personally reviewed this patient with the CRNA. Discussed and agreed on the Anesthesia Plan with the CRNA. Wil Speaker

## 2023-10-11 ENCOUNTER — ANESTHESIA (OUTPATIENT)
Dept: PERIOP | Facility: HOSPITAL | Age: 73
End: 2023-10-11
Payer: COMMERCIAL

## 2023-10-11 ENCOUNTER — HOSPITAL ENCOUNTER (OUTPATIENT)
Facility: HOSPITAL | Age: 73
Setting detail: OUTPATIENT SURGERY
Discharge: HOME | End: 2023-10-12
Attending: OTOLARYNGOLOGY | Admitting: OTOLARYNGOLOGY
Payer: COMMERCIAL

## 2023-10-11 DIAGNOSIS — C73 PAPILLARY THYROID CARCINOMA (HCC): ICD-10-CM

## 2023-10-11 DIAGNOSIS — E04.1 THYROID NODULE: ICD-10-CM

## 2023-10-11 DIAGNOSIS — E89.0 S/P TOTAL THYROIDECTOMY: Primary | ICD-10-CM

## 2023-10-11 PROCEDURE — 88342 IMHCHEM/IMCYTCHM 1ST ANTB: CPT | Performed by: PATHOLOGY

## 2023-10-11 PROCEDURE — 88307 TISSUE EXAM BY PATHOLOGIST: CPT | Performed by: PATHOLOGY

## 2023-10-11 PROCEDURE — 60240 REMOVAL OF THYROID: CPT | Performed by: OTOLARYNGOLOGY

## 2023-10-11 RX ORDER — FINASTERIDE 5 MG/1
5 TABLET, FILM COATED ORAL DAILY
Status: DISCONTINUED | OUTPATIENT
Start: 2023-10-12 | End: 2023-10-12 | Stop reason: HOSPADM

## 2023-10-11 RX ORDER — METOCLOPRAMIDE HYDROCHLORIDE 5 MG/ML
10 INJECTION INTRAMUSCULAR; INTRAVENOUS ONCE AS NEEDED
Status: DISCONTINUED | OUTPATIENT
Start: 2023-10-11 | End: 2023-10-11 | Stop reason: HOSPADM

## 2023-10-11 RX ORDER — LABETALOL HYDROCHLORIDE 5 MG/ML
10 INJECTION, SOLUTION INTRAVENOUS EVERY 4 HOURS PRN
Status: DISCONTINUED | OUTPATIENT
Start: 2023-10-11 | End: 2023-10-12 | Stop reason: HOSPADM

## 2023-10-11 RX ORDER — CALCIUM CARBONATE 500 MG/1
1000 TABLET, CHEWABLE ORAL 3 TIMES DAILY
Status: DISCONTINUED | OUTPATIENT
Start: 2023-10-11 | End: 2023-10-12 | Stop reason: HOSPADM

## 2023-10-11 RX ORDER — MAGNESIUM HYDROXIDE 1200 MG/15ML
LIQUID ORAL AS NEEDED
Status: DISCONTINUED | OUTPATIENT
Start: 2023-10-11 | End: 2023-10-11 | Stop reason: HOSPADM

## 2023-10-11 RX ORDER — SODIUM CHLORIDE, SODIUM LACTATE, POTASSIUM CHLORIDE, CALCIUM CHLORIDE 600; 310; 30; 20 MG/100ML; MG/100ML; MG/100ML; MG/100ML
125 INJECTION, SOLUTION INTRAVENOUS CONTINUOUS
Status: DISCONTINUED | OUTPATIENT
Start: 2023-10-11 | End: 2023-10-12 | Stop reason: HOSPADM

## 2023-10-11 RX ORDER — IBUPROFEN 600 MG/1
600 TABLET ORAL EVERY 8 HOURS PRN
Status: DISCONTINUED | OUTPATIENT
Start: 2023-10-11 | End: 2023-10-12 | Stop reason: HOSPADM

## 2023-10-11 RX ORDER — HYDROMORPHONE HCL/PF 1 MG/ML
0.5 SYRINGE (ML) INJECTION
Status: DISCONTINUED | OUTPATIENT
Start: 2023-10-11 | End: 2023-10-11 | Stop reason: HOSPADM

## 2023-10-11 RX ORDER — PROMETHAZINE HYDROCHLORIDE 25 MG/ML
12.5 INJECTION, SOLUTION INTRAMUSCULAR; INTRAVENOUS ONCE AS NEEDED
Status: DISCONTINUED | OUTPATIENT
Start: 2023-10-11 | End: 2023-10-11 | Stop reason: HOSPADM

## 2023-10-11 RX ORDER — LABETALOL HYDROCHLORIDE 5 MG/ML
10 INJECTION, SOLUTION INTRAVENOUS ONCE
Status: COMPLETED | OUTPATIENT
Start: 2023-10-11 | End: 2023-10-11

## 2023-10-11 RX ORDER — SODIUM CHLORIDE, SODIUM LACTATE, POTASSIUM CHLORIDE, CALCIUM CHLORIDE 600; 310; 30; 20 MG/100ML; MG/100ML; MG/100ML; MG/100ML
INJECTION, SOLUTION INTRAVENOUS CONTINUOUS PRN
Status: DISCONTINUED | OUTPATIENT
Start: 2023-10-11 | End: 2023-10-11

## 2023-10-11 RX ORDER — LEVOTHYROXINE SODIUM 112 UG/1
112 TABLET ORAL
Status: DISCONTINUED | OUTPATIENT
Start: 2023-10-11 | End: 2023-10-12 | Stop reason: HOSPADM

## 2023-10-11 RX ORDER — FENTANYL CITRATE/PF 50 MCG/ML
25 SYRINGE (ML) INJECTION
Status: DISCONTINUED | OUTPATIENT
Start: 2023-10-11 | End: 2023-10-11 | Stop reason: HOSPADM

## 2023-10-11 RX ORDER — ONDANSETRON 2 MG/ML
4 INJECTION INTRAMUSCULAR; INTRAVENOUS EVERY 6 HOURS PRN
Status: DISCONTINUED | OUTPATIENT
Start: 2023-10-11 | End: 2023-10-12 | Stop reason: HOSPADM

## 2023-10-11 RX ORDER — HYDROMORPHONE HCL/PF 1 MG/ML
0.2 SYRINGE (ML) INJECTION EVERY 6 HOURS PRN
Status: DISCONTINUED | OUTPATIENT
Start: 2023-10-11 | End: 2023-10-12 | Stop reason: HOSPADM

## 2023-10-11 RX ORDER — LABETALOL HYDROCHLORIDE 5 MG/ML
10 INJECTION, SOLUTION INTRAVENOUS
Status: DISCONTINUED | OUTPATIENT
Start: 2023-10-11 | End: 2023-10-11 | Stop reason: HOSPADM

## 2023-10-11 RX ORDER — ONDANSETRON 2 MG/ML
INJECTION INTRAMUSCULAR; INTRAVENOUS AS NEEDED
Status: DISCONTINUED | OUTPATIENT
Start: 2023-10-11 | End: 2023-10-11

## 2023-10-11 RX ORDER — HYDRALAZINE HYDROCHLORIDE 20 MG/ML
5 INJECTION INTRAMUSCULAR; INTRAVENOUS
Status: DISCONTINUED | OUTPATIENT
Start: 2023-10-11 | End: 2023-10-11 | Stop reason: HOSPADM

## 2023-10-11 RX ORDER — CITALOPRAM 20 MG/1
20 TABLET ORAL DAILY
Status: DISCONTINUED | OUTPATIENT
Start: 2023-10-12 | End: 2023-10-12 | Stop reason: HOSPADM

## 2023-10-11 RX ORDER — PROPOFOL 10 MG/ML
INJECTION, EMULSION INTRAVENOUS AS NEEDED
Status: DISCONTINUED | OUTPATIENT
Start: 2023-10-11 | End: 2023-10-11

## 2023-10-11 RX ORDER — ONDANSETRON 2 MG/ML
4 INJECTION INTRAMUSCULAR; INTRAVENOUS ONCE AS NEEDED
Status: DISCONTINUED | OUTPATIENT
Start: 2023-10-11 | End: 2023-10-11 | Stop reason: HOSPADM

## 2023-10-11 RX ORDER — FENTANYL CITRATE 50 UG/ML
INJECTION, SOLUTION INTRAMUSCULAR; INTRAVENOUS AS NEEDED
Status: DISCONTINUED | OUTPATIENT
Start: 2023-10-11 | End: 2023-10-11

## 2023-10-11 RX ORDER — LIDOCAINE HYDROCHLORIDE 20 MG/ML
INJECTION, SOLUTION EPIDURAL; INFILTRATION; INTRACAUDAL; PERINEURAL AS NEEDED
Status: DISCONTINUED | OUTPATIENT
Start: 2023-10-11 | End: 2023-10-11

## 2023-10-11 RX ORDER — HYDROMORPHONE HCL/PF 1 MG/ML
SYRINGE (ML) INJECTION AS NEEDED
Status: DISCONTINUED | OUTPATIENT
Start: 2023-10-11 | End: 2023-10-11

## 2023-10-11 RX ORDER — HEPARIN SODIUM 5000 [USP'U]/ML
5000 INJECTION, SOLUTION INTRAVENOUS; SUBCUTANEOUS EVERY 8 HOURS SCHEDULED
Status: DISCONTINUED | OUTPATIENT
Start: 2023-10-11 | End: 2023-10-12 | Stop reason: HOSPADM

## 2023-10-11 RX ORDER — MIDAZOLAM HYDROCHLORIDE 2 MG/2ML
INJECTION, SOLUTION INTRAMUSCULAR; INTRAVENOUS AS NEEDED
Status: DISCONTINUED | OUTPATIENT
Start: 2023-10-11 | End: 2023-10-11

## 2023-10-11 RX ORDER — ATORVASTATIN CALCIUM 40 MG/1
40 TABLET, FILM COATED ORAL EVERY EVENING
Status: DISCONTINUED | OUTPATIENT
Start: 2023-10-11 | End: 2023-10-12 | Stop reason: HOSPADM

## 2023-10-11 RX ORDER — ALBUTEROL SULFATE 2.5 MG/3ML
2.5 SOLUTION RESPIRATORY (INHALATION) ONCE AS NEEDED
Status: DISCONTINUED | OUTPATIENT
Start: 2023-10-11 | End: 2023-10-11 | Stop reason: HOSPADM

## 2023-10-11 RX ORDER — DEXAMETHASONE SODIUM PHOSPHATE 10 MG/ML
INJECTION, SOLUTION INTRAMUSCULAR; INTRAVENOUS AS NEEDED
Status: DISCONTINUED | OUTPATIENT
Start: 2023-10-11 | End: 2023-10-11

## 2023-10-11 RX ORDER — LORAZEPAM 2 MG/ML
1 INJECTION INTRAMUSCULAR
Status: DISCONTINUED | OUTPATIENT
Start: 2023-10-11 | End: 2023-10-11 | Stop reason: HOSPADM

## 2023-10-11 RX ORDER — ALBUMIN, HUMAN INJ 5% 5 %
SOLUTION INTRAVENOUS CONTINUOUS PRN
Status: DISCONTINUED | OUTPATIENT
Start: 2023-10-11 | End: 2023-10-11

## 2023-10-11 RX ORDER — AMOXICILLIN 250 MG
1 CAPSULE ORAL
Status: DISCONTINUED | OUTPATIENT
Start: 2023-10-11 | End: 2023-10-12 | Stop reason: HOSPADM

## 2023-10-11 RX ORDER — SUCCINYLCHOLINE/SOD CL,ISO/PF 100 MG/5ML
SYRINGE (ML) INTRAVENOUS AS NEEDED
Status: DISCONTINUED | OUTPATIENT
Start: 2023-10-11 | End: 2023-10-11

## 2023-10-11 RX ORDER — TAMSULOSIN HYDROCHLORIDE 0.4 MG/1
0.4 CAPSULE ORAL
Status: DISCONTINUED | OUTPATIENT
Start: 2023-10-12 | End: 2023-10-12 | Stop reason: HOSPADM

## 2023-10-11 RX ORDER — ACETAMINOPHEN 325 MG/1
650 TABLET ORAL EVERY 8 HOURS PRN
Status: DISCONTINUED | OUTPATIENT
Start: 2023-10-11 | End: 2023-10-12 | Stop reason: HOSPADM

## 2023-10-11 RX ORDER — HYDROXYZINE HYDROCHLORIDE 25 MG/1
25 TABLET, FILM COATED ORAL
Status: DISCONTINUED | OUTPATIENT
Start: 2023-10-11 | End: 2023-10-12 | Stop reason: HOSPADM

## 2023-10-11 RX ORDER — DIAZEPAM 5 MG/ML
5 INJECTION, SOLUTION INTRAMUSCULAR; INTRAVENOUS
Status: DISCONTINUED | OUTPATIENT
Start: 2023-10-11 | End: 2023-10-12 | Stop reason: HOSPADM

## 2023-10-11 RX ORDER — LIDOCAINE HYDROCHLORIDE AND EPINEPHRINE 10; 10 MG/ML; UG/ML
INJECTION, SOLUTION INFILTRATION; PERINEURAL AS NEEDED
Status: DISCONTINUED | OUTPATIENT
Start: 2023-10-11 | End: 2023-10-11 | Stop reason: HOSPADM

## 2023-10-11 RX ORDER — HYDROMORPHONE HCL IN WATER/PF 6 MG/30 ML
0.2 PATIENT CONTROLLED ANALGESIA SYRINGE INTRAVENOUS
Status: DISCONTINUED | OUTPATIENT
Start: 2023-10-11 | End: 2023-10-11 | Stop reason: HOSPADM

## 2023-10-11 RX ORDER — TAMSULOSIN HYDROCHLORIDE 0.4 MG/1
0.4 CAPSULE ORAL 2 TIMES DAILY
Status: DISCONTINUED | OUTPATIENT
Start: 2023-10-11 | End: 2023-10-11

## 2023-10-11 RX ADMIN — TAMSULOSIN HYDROCHLORIDE 0.4 MG: 0.4 CAPSULE ORAL at 17:09

## 2023-10-11 RX ADMIN — PROPOFOL 30 MCG/KG/MIN: 10 INJECTION, EMULSION INTRAVENOUS at 07:46

## 2023-10-11 RX ADMIN — FENTANYL CITRATE 25 MCG: 50 INJECTION INTRAMUSCULAR; INTRAVENOUS at 11:25

## 2023-10-11 RX ADMIN — FENTANYL CITRATE 50 MCG: 50 INJECTION INTRAMUSCULAR; INTRAVENOUS at 07:44

## 2023-10-11 RX ADMIN — Medication 10 MG: at 17:09

## 2023-10-11 RX ADMIN — SODIUM CHLORIDE, SODIUM LACTATE, POTASSIUM CHLORIDE, AND CALCIUM CHLORIDE: .6; .31; .03; .02 INJECTION, SOLUTION INTRAVENOUS at 07:39

## 2023-10-11 RX ADMIN — ONDANSETRON 4 MG: 2 INJECTION INTRAMUSCULAR; INTRAVENOUS at 07:44

## 2023-10-11 RX ADMIN — LEVOTHYROXINE SODIUM 112 MCG: 112 TABLET ORAL at 13:58

## 2023-10-11 RX ADMIN — FENTANYL CITRATE 50 MCG: 50 INJECTION INTRAMUSCULAR; INTRAVENOUS at 07:39

## 2023-10-11 RX ADMIN — PHENYLEPHRINE HYDROCHLORIDE 100 MCG: 10 INJECTION, SOLUTION INTRAVENOUS at 08:01

## 2023-10-11 RX ADMIN — MIDAZOLAM 2 MG: 1 INJECTION INTRAMUSCULAR; INTRAVENOUS at 07:36

## 2023-10-11 RX ADMIN — ATORVASTATIN CALCIUM 40 MG: 40 TABLET, FILM COATED ORAL at 21:07

## 2023-10-11 RX ADMIN — PHENYLEPHRINE HYDROCHLORIDE 50 MCG: 10 INJECTION, SOLUTION INTRAVENOUS at 08:32

## 2023-10-11 RX ADMIN — Medication 140 MG: at 07:44

## 2023-10-11 RX ADMIN — HYDROMORPHONE HYDROCHLORIDE 0.2 MG: 0.2 INJECTION, SOLUTION INTRAMUSCULAR; INTRAVENOUS; SUBCUTANEOUS at 11:43

## 2023-10-11 RX ADMIN — PHENYLEPHRINE HYDROCHLORIDE 30 MCG/MIN: 10 INJECTION, SOLUTION INTRAVENOUS at 07:58

## 2023-10-11 RX ADMIN — Medication 10 MG: at 13:39

## 2023-10-11 RX ADMIN — SODIUM CHLORIDE, SODIUM LACTATE, POTASSIUM CHLORIDE, AND CALCIUM CHLORIDE: .6; .31; .03; .02 INJECTION, SOLUTION INTRAVENOUS at 10:25

## 2023-10-11 RX ADMIN — FENTANYL CITRATE 25 MCG: 50 INJECTION INTRAMUSCULAR; INTRAVENOUS at 11:32

## 2023-10-11 RX ADMIN — HYDROMORPHONE HYDROCHLORIDE 0.5 MG: 1 INJECTION, SOLUTION INTRAMUSCULAR; INTRAVENOUS; SUBCUTANEOUS at 07:47

## 2023-10-11 RX ADMIN — LIDOCAINE HYDROCHLORIDE 80 MG: 20 INJECTION, SOLUTION EPIDURAL; INFILTRATION; INTRACAUDAL; PERINEURAL at 07:44

## 2023-10-11 RX ADMIN — DEXAMETHASONE SODIUM PHOSPHATE 10 MG: 10 INJECTION, SOLUTION INTRAMUSCULAR; INTRAVENOUS at 07:46

## 2023-10-11 RX ADMIN — PROPOFOL 200 MG: 10 INJECTION, EMULSION INTRAVENOUS at 07:44

## 2023-10-11 RX ADMIN — ALBUMIN (HUMAN): 12.5 INJECTION, SOLUTION INTRAVENOUS at 08:42

## 2023-10-11 RX ADMIN — FENTANYL CITRATE 25 MCG: 50 INJECTION INTRAMUSCULAR; INTRAVENOUS at 11:20

## 2023-10-11 RX ADMIN — FENTANYL CITRATE 25 MCG: 50 INJECTION INTRAMUSCULAR; INTRAVENOUS at 11:38

## 2023-10-11 RX ADMIN — HYDROXYZINE HYDROCHLORIDE 25 MG: 25 TABLET ORAL at 22:39

## 2023-10-11 RX ADMIN — DIAZEPAM 5 MG: 10 INJECTION, SOLUTION INTRAMUSCULAR; INTRAVENOUS at 22:39

## 2023-10-11 RX ADMIN — PHENYLEPHRINE HYDROCHLORIDE 100 MCG: 10 INJECTION, SOLUTION INTRAVENOUS at 10:16

## 2023-10-11 RX ADMIN — SODIUM CHLORIDE, SODIUM LACTATE, POTASSIUM CHLORIDE, CALCIUM CHLORIDE: 600; 310; 30; 20 INJECTION, SOLUTION INTRAVENOUS at 07:46

## 2023-10-11 RX ADMIN — PHENYLEPHRINE HYDROCHLORIDE 100 MCG: 10 INJECTION, SOLUTION INTRAVENOUS at 08:04

## 2023-10-11 RX ADMIN — SENNOSIDES AND DOCUSATE SODIUM 1 TABLET: 8.6; 5 TABLET ORAL at 21:07

## 2023-10-11 RX ADMIN — ACETAMINOPHEN 650 MG: 325 TABLET, FILM COATED ORAL at 21:07

## 2023-10-11 NOTE — OP NOTE
OPERATIVE REPORT  PATIENT NAME: Serena Fox    :  1950  MRN: [id number]  Pt Location: AN OR ROOM 03    SURGERY DATE: 10/11/2023    Surgeon(s) and Role:     * Laura Brice MD - Primary     * Orion Steward MD - Assisting    Preop Diagnosis:  Thyroid nodule [E04.1]  Papillary thyroid carcinoma ([address]) [C73]    Post-Op Diagnosis Codes: * Thyroid nodule [E04.1]     * Papillary thyroid carcinoma ([address]) [C73]    Procedure(s):  TOTAL THYROIDECTOMY WITH NIMS    Specimen(s):  ID Type Source Tests Collected by Time Destination   1 : Thyroid - suture marks left superior Tissue Thyroid TISSUE EXAM Laura Brice MD 10/11/2023 1035        Estimated Blood Loss:   250 mL    Drains:  * No LDAs found *    Anesthesia Type:   General    Operative Indications:  Thyroid nodule [E04.1]  Papillary thyroid carcinoma (HCC) [C73]      Operative Findings:  Intact RLN bilat - stim at 0.5ma  Intact bilat sup para and R inf para    Complications:   None    Procedure and Technique:  The patient was positively identified and transferred onto the operating table in the supine position. Appropriate monitoring devices were put in place, anesthesia was induced and the patient was intubated without difficulty. A shoulder roll was placed, and the patient’s neck was examined. An appropriate site for skin incision was demarcated 1cm below the cricoid cartilage. Before proceeding further, the timeout process was completed. Local anesthesia in the form of 1% lidocaine with 1/100,000 epinephrine was then injected to the marked site. The patient’s neck was then prepped and draped in the usual sterile fashion. NIMS was then setup, calibrated, and found to be working appropriately. Skin incision was then made at the marked site in a transverse fashion using a 15 blade. Dissection continued through subcutaneous tissues and platysma using the 15 blade and Bovie cautery.   Superficial flaps were then elevated in the subplatysmal plane using Bovie cautery. Dissection then continued in midline in between strap musculature using DeBakey forceps and Bovie cautery. Strap muscles were then elevated off the underlying thyroid gland on the left side using Bovie cautery and blunt dissection. The strap muscles were then held in a retracted position using an [employer] retractor. Dissection then continued around the lobe of the thyroid gland, immediately adjacent to the capsule of the gland using bipolar and cautery. This dissection was carried out along the superior aspect of the isthmus, extending along the medial aspect of the upper pole. Care was taken to elevate a pyramidal lobe that was encountered, and it was reflected inferiorly to remain in continuity with the isthmus. The superior pole was retracted medially and inferiorly, and dissection continued around the lateral aspect of the superior lobe. The superior pedicle was divided using bipolar. This allowed further reflection and retraction of the gland medially, and dissection continued inferiorly, with care taken to remain as close as possible to the capsule of the gland to minimize risk of injury or sacrifice of parathyroid glands. With continued dissection, the recurrent laryngeal nerve was identified. Remaining tissue attachments at [facility] ligament were then divided using harmonic, with care taken to limit extent of dissection at the point of entry of the recurrent laryngeal nerve. Remaining attachments to the anterior trachea were divided using cautery. Attention was then turned to the right lobe. Strap muscles were elevated off the underlying thyroid gland on the right side using Bovie cautery and blunt dissection. The strap muscles were then held in a retracted position using an [employer] retractor. Dissection then continued around the lobe of the thyroid gland, immediately adjacent to the capsule of the gland using bipolar cautery.   This dissection was carried out along the superior aspect of the isthmus, extending along the medial aspect of the upper pole, while retracting the left lobe and isthmus toward the left. The superior pole was retracted medially and inferiorly, and dissection continued around the lateral aspect of the superior lobe. The superior pedicle was divided using the bipolar. This allowed further reflection and retraction of the gland medially, and dissection continued inferiorly, with care taken to remain as close as possible to the capsule of the gland to minimize risk of injury or sacrifice of parathyroid glands. With continued dissection, the recurrent laryngeal nerve was identified. Remaining tissue attachments at [facility] ligament were then divided using bipolar, with care taken to limit extent of dissection at the point of entry of the recurrent laryngeal nerve. Remaining attachments to the anterior trachea were divided using bipolar cautery, and the entire thyroid gland was removed, and carefully examined. No adherent parathyroid glands were noted, and the gland was set aside to send to pathology for permanent section evaluation. The bilateral recurrent laryngeal nerves were stimulated at 0.5mA resulting in palpable contraction of the bilateral PCA muscles and stim on the NIMS machine. The surgical site was irrigated with saline, and hemostasis was ensured using bipolar cautery. Fibrillar hemostatic agent was applied. The surgical site was then closed in multiple layers. Strap muscles were re-approximated across midline using 3-0 Vicryl stitches in a figure of eight fashion, and the same stitch was used in an interrupted fashion to reapproximate the plastysma and tissue in midline at the same plane. Skin was closed using a 5-0 Monocryl stitch in a running sub-cuticular fashion, followed by a Steristrip. Anesthesia was then reversed, and the patient was awakened, extubated and taken to the recovery room in stable condition.  All counts were correct at the end of the case, and no complications were encountered. I was present for the entire procedure.     Patient Disposition:  PACU         SIGNATURE: Jose Angel Davies MD  DATE: October 11, 2023  TIME: 11:05 AM

## 2023-10-11 NOTE — H&P
I find no changes in the history and physical examination performed on 23. OR today for total thyroidectomy with NIMS. General: AAOx3  Head: normocephalic, atraumatic  Neck: soft  Cardio: no chest pain  Pulm: breathing well, no stridor  Abdomen: soft, no rebound tenderness  Extremities: well perfused  Skin: warm      [facility] Otolaryngology Follow up visit        CC: Thyroid   Independent historian: none        Pertinent interval elements of the history:  Hx of thyroid nodule   Here to review thyroid FNAB     Sister with hx of thyroid CA-- possible PELLETIER  No radiation exposure     Plavix-- s/p cardiac stents ~ several years ago     Hx of R AN-- follows Dr. Meryl Su of any relevant imaging: images from any scan reviewed personally  Scans: US head/neck LN mapping 23: clear      Thyroid US 23:  Right thyroid lobe: 5.0 x 1.2 x 1.6 cm, Left thyroid lobe: 6.3 x 3.9 x 4.2 cm   Thyroid isthmus: 6 mm in thickness     Nodule 1 Left lobe: There is a well-defined heterogeneous isoechoic dominant solid nodule occupying   a large portion of the left thyroid lobe measuring 5.4 x 3.8 x 3.6 cm in diameter, with increase in internal color Doppler flow. This is amenable to   ultrasound guided biopsy. DINORAH category: Low suspicion.       Labs: left thyroid nodule FNAB 23 [place] VI: Malignant; papillary thyroid CA  Notes:      Interval Review of systems:  General: no weight change, normal energy  CV: no palpitations or chest pain  Pulm: no shortness of breath     Interval Social History:  Social History               Socioeconomic History    Marital status:        Spouse name: Not on file    Number of children: Not on file    Years of education: Not on file    Highest education level: Not on file   Occupational History    Not on file   Tobacco Use    Smoking status: Former       Types: Cigarettes       Quit date: 10/5/1990       Years since quittin.9    Smokeless tobacco: Never Substance and Sexual Activity    Alcohol use: Yes       Comment: occasionally, 2-3 per week. Drug use: No    Sexual activity: Not on file   Other Topics Concern    Not on file   Social History Narrative    Not on file      Social Determinants of Health      Financial Resource Strain: Not on file   Food Insecurity: Not on file   Transportation Needs: Not on file   Physical Activity: Not on file   Stress: Not on file   Social Connections: Not on file   Intimate Partner Violence: Not on file   Housing Stability: Not on file            Interval Physical Examination:  Ht 5' 10" (1.778 m)   Wt 91.6 kg (202 lb)   BMI 28.98 kg/m²   NAD  L Thyroid fullness     Interval endoscopy:       Laryngoscopy            Performed by Ya Aranda. Essie Prasad MD      Authorized by Ya Aranda. Essie Prasad MD        Consent: Verbal consent obtained. Consent given by: patient  Patient understanding: patient states understanding of the procedure being performed        Local anesthesia used: yes      Anesthesia    Local anesthesia used: yes  Local Anesthetic: topical anesthetic      Sedation    Patient sedated: no           Culture: no   Procedure Details    Procedure Notes:  Nasopharynx unremarkable, with normal eustachian tube orifices and normal Fossa of Rosenmuller bilaterally. Posterior nasopharyngeal and oropharyngeal walls normal. Oropharyngeal mucosa moist, no masses or lesions. Tongue base normal without masses or lesions. Vallecula and pyriform sinuses clear, without pooling of secretions. Epiglottis, aryepiglottic folds and remainder of supraglottis well-appearing. True vocal folds mobile, without masses or lesions, normal glottic chink. Patient tolerance: Patient tolerated the procedure well with no immediate complications               Assessment:  1. Papillary thyroid carcinoma ([address])          2.  Thyroid nodule

## 2023-10-11 NOTE — ANESTHESIA POSTPROCEDURE EVALUATION
Post-Op Assessment Note    CV Status:  Stable    Pain management: satisfactory to patient     Mental Status:  Awake   Hydration Status:  Stable   PONV Controlled:  None   Airway Patency:  Patent   Two or more mitigation strategies used for obstructive sleep apnea   Post Op Vitals Reviewed: Yes      Staff: Anesthesiologist         No notable events documented.     BP  136/100   Temp  97.7F   Pulse  101   Resp   15   SpO2   97%

## 2023-10-12 VITALS
HEART RATE: 72 BPM | OXYGEN SATURATION: 96 % | TEMPERATURE: 98.6 F | RESPIRATION RATE: 18 BRPM | DIASTOLIC BLOOD PRESSURE: 74 MMHG | SYSTOLIC BLOOD PRESSURE: 159 MMHG

## 2023-10-12 PROBLEM — C73 PAPILLARY THYROID CARCINOMA (HCC): Status: ACTIVE | Noted: 2023-10-12

## 2023-10-12 RX ORDER — B-COMPLEX WITH VITAMIN C
TABLET ORAL
Qty: 84 TABLET | Refills: 0 | Status: SHIPPED | OUTPATIENT
Start: 2023-10-12 | End: 2023-11-10

## 2023-10-12 RX ORDER — LEVOTHYROXINE SODIUM 112 UG/1
112 TABLET ORAL
Qty: 30 TABLET | Refills: 3 | Status: SHIPPED | OUTPATIENT
Start: 2023-10-13 | End: 2023-11-06 | Stop reason: SDUPTHER

## 2023-10-12 RX ADMIN — ACETAMINOPHEN 650 MG: 325 TABLET, FILM COATED ORAL at 06:40

## 2023-10-12 RX ADMIN — CALCIUM CARBONATE (ANTACID) CHEW TAB 500 MG 1000 MG: 500 CHEW TAB at 09:08

## 2023-10-12 RX ADMIN — FINASTERIDE 5 MG: 5 TABLET, FILM COATED ORAL at 09:08

## 2023-10-12 RX ADMIN — HEPARIN SODIUM 5000 UNITS: 5000 INJECTION INTRAVENOUS; SUBCUTANEOUS at 06:41

## 2023-10-12 RX ADMIN — CITALOPRAM HYDROBROMIDE 20 MG: 20 TABLET ORAL at 09:08

## 2023-10-12 RX ADMIN — LEVOTHYROXINE SODIUM 112 MCG: 112 TABLET ORAL at 06:40

## 2023-10-12 NOTE — PLAN OF CARE
Problem: PAIN - ADULT  Goal: Verbalizes/displays adequate comfort level or baseline comfort level  Description: Interventions:  - Encourage patient to monitor pain and request assistance  - Assess pain using appropriate pain scale  - Administer analgesics based on type and severity of pain and evaluate response  - Implement non-pharmacological measures as appropriate and evaluate response  - Consider cultural and social influences on pain and pain management  - Notify physician/advanced practitioner if interventions unsuccessful or patient reports new pain  Outcome: Adequate for Discharge     Problem: INFECTION - ADULT  Goal: Absence or prevention of progression during hospitalization  Description: INTERVENTIONS:  - Assess and monitor for signs and symptoms of infection  - Monitor lab/diagnostic results  - Monitor all insertion sites, i.e. indwelling lines, tubes, and drains  - Monitor endotracheal if appropriate and nasal secretions for changes in amount and color  - [place] appropriate cooling/warming therapies per order  - Administer medications as ordered  - Instruct and encourage patient and family to use good hand hygiene technique  - Identify and instruct in appropriate isolation precautions for identified infection/condition  Outcome: Adequate for Discharge  Goal: Absence of fever/infection during neutropenic period  Description: INTERVENTIONS:  - Monitor WBC    Outcome: Adequate for Discharge     Problem: SAFETY ADULT  Goal: Patient will remain free of falls  Description: INTERVENTIONS:  - Educate patient/family on patient safety including physical limitations  Outcome: Adequate for Discharge  Goal: Maintain or return to baseline ADL function  Description: INTERVENTIONS:  -  Assess patient's ability to carry out ADLs; assess patient's baseline for ADL function and identify physical deficits which impact ability to perform ADLs (bathing, care of mouth/teeth, toileting, grooming, dressing, etc.)  - Assess/evaluate cause of self-care deficits   - Assess range of motion  - Assess patient's mobility; develop plan if impaired  - Assess patient's need for assistive devices and provide as appropriate  - Encourage maximum independence but intervene and supervise when necessary  - Involve family in performance of ADLs  - Assess for home care needs following discharge   - Consider OT consult to assist with ADL evaluation and planning for discharge  - Provide patient education as appropriate  Outcome: Adequate for Discharge  Goal: Maintains/Returns to pre admission functional level  Description: INTERVENTIONS:  - Perform BMAT or MOVE assessment daily.   - Set and communicate daily mobility goal to care team and patient/family/caregiver.    - Collaborate with rehabilitation services on mobility goals if consulted  - Record patient progress and toleration of activity level   Outcome: Adequate for Discharge

## 2023-10-12 NOTE — PLAN OF CARE
Problem: PAIN - ADULT  Goal: Verbalizes/displays adequate comfort level or baseline comfort level  Description: Interventions:  - Encourage patient to monitor pain and request assistance  - Assess pain using appropriate pain scale  - Administer analgesics based on type and severity of pain and evaluate response  - Implement non-pharmacological measures as appropriate and evaluate response  - Consider cultural and social influences on pain and pain management  - Notify physician/advanced practitioner if interventions unsuccessful or patient reports new pain  Outcome: Progressing     Problem: INFECTION - ADULT  Goal: Absence or prevention of progression during hospitalization  Description: INTERVENTIONS:  - Assess and monitor for signs and symptoms of infection  - Monitor lab/diagnostic results  - Monitor all insertion sites, i.e. indwelling lines, tubes, and drains  - Monitor endotracheal if appropriate and nasal secretions for changes in amount and color  - [place] appropriate cooling/warming therapies per order  - Administer medications as ordered  - Instruct and encourage patient and family to use good hand hygiene technique  - Identify and instruct in appropriate isolation precautions for identified infection/condition  Outcome: Progressing  Goal: Absence of fever/infection during neutropenic period  Description: INTERVENTIONS:  - Monitor WBC    Outcome: Progressing     Problem: SAFETY ADULT  Goal: Patient will remain free of falls  Description: INTERVENTIONS:  - Educate patient/family on patient safety including physical limitations  - Instruct patient to call for assistance with activity   - Consult OT/PT to assist with strengthening/mobility   - Keep Call bell within reach  - Keep bed low and locked with side rails adjusted as appropriate  - Keep care items and personal belongings within reach  - Initiate and maintain comfort rounds  - Make Fall Risk Sign visible to staff  - Offer Toileting every 3 Hours, in advance of need  - Initiate/Maintain 2alarm  - Obtain necessary fall risk management equipment: 3  - Apply yellow socks and bracelet for high fall risk patients  - Consider moving patient to room near nurses station  Outcome: Progressing  Goal: Maintain or return to baseline ADL function  Description: INTERVENTIONS:  -  Assess patient's ability to carry out ADLs; assess patient's baseline for ADL function and identify physical deficits which impact ability to perform ADLs (bathing, care of mouth/teeth, toileting, grooming, dressing, etc.)  - Assess/evaluate cause of self-care deficits   - Assess range of motion  - Assess patient's mobility; develop plan if impaired  - Assess patient's need for assistive devices and provide as appropriate  - Encourage maximum independence but intervene and supervise when necessary  - Involve family in performance of ADLs  - Assess for home care needs following discharge   - Consider OT consult to assist with ADL evaluation and planning for discharge  - Provide patient education as appropriate  Outcome: Progressing  Goal: Maintains/Returns to pre admission functional level  Description: INTERVENTIONS:  - Perform BMAT or MOVE assessment daily.   - Set and communicate daily mobility goal to care team and patient/family/caregiver. - Collaborate with rehabilitation services on mobility goals if consulted  - Perform Range of Motion 3 times a day. - Reposition patient every 3 hours.   - Dangle patient 3 times a day  - Stand patient 3 times a day  - Ambulate patient 3 times a day  - Out of bed to chair 3 times a day   - Out of bed for meals 3 times a day  - Out of bed for toileting  - Record patient progress and toleration of activity level   Outcome: Progressing     Problem: DISCHARGE PLANNING  Goal: Discharge to home or other facility with appropriate resources  Description: INTERVENTIONS:  - Identify barriers to discharge w/patient and caregiver  - Arrange for needed discharge resources and transportation as appropriate  - Identify discharge learning needs (meds, wound care, etc.)  - Arrange for interpretive services to assist at discharge as needed  - Refer to Case Management Department for coordinating discharge planning if the patient needs post-hospital services based on physician/advanced practitioner order or complex needs related to functional status, cognitive ability, or social support system  Outcome: Progressing     Problem: Knowledge Deficit  Goal: Patient/family/caregiver demonstrates understanding of disease process, treatment plan, medications, and discharge instructions  Description: Complete learning assessment and assess knowledge base.   Interventions:  - Provide teaching at level of understanding  - Provide teaching via preferred learning methods  Outcome: Progressing

## 2023-10-12 NOTE — PROGRESS NOTES
OTOLARYNGOLOGY PROGRESS NOTE    Date of Service: 10/12/2023 4:43 AM    HPI  Patient is a 68 y.o. 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 Overnight Events: no acute events. Denies any numbness or tingling of fingers toes or lips. PHYSICAL EXAM:  Vitals:    10/12/23 0242   BP: 146/76   Pulse: 80   Resp: 15   Temp: 98.5 °F (36.9 °C)   SpO2: 96%        General: No acute distress, AOx4  HEENT: Steri strips intact on transcervical incision, mild neck hematoma, voice good  Neurology: No focal deficits  Lungs: Breathing easy, unlabored and even on room air  Cardio: RRR, well perfused  Abd: soft, NT, ND      Intake/Output Summary (Last 24 hours) at 10/12/2023 0443  Last data filed at 10/11/2023 1107  Gross per 24 hour   Intake 2050 ml   Output 250 ml   Net 1800 ml         LABORATORY    No results for input(s): "WBC", "HGB", "HCT", "PLT" in the last 72 hours. No results for input(s): "NA", "K", "CL", "BUN", "CREAT", "PHOS", "MG" in the last 72 hours. Invalid input(s): "TCO2", "GLU", "CA", "CAIONIZ"    Invalid input(s): "PTPAT", "PTINR", "APTTMNNM", "APTTPAT"      Patient Active Problem List   Diagnosis    Acute bronchitis    BPH with obstruction/lower urinary tract symptoms    Family history of colon cancer    History of colon polyps    Vertigo, benign paroxysmal    CAD (coronary artery disease)    S/P drug eluting coronary stent placement    Hyperlipidemia    HTN (hypertension)    Anxiety         ASSESSMENT  Patient is a 68 y.o. male w/ acute and chronic problems as above, who presents s/p total thyroidectomy. PLAN  - calcium 1 g TID  - morning labs  - likely adequate for discharge    [place]. Jessie Baltazar MD PGY-3  [facility] Otolaryngology - Head and Neck Surgery  Available on [facility] Text. Please contact ENT Resident [place] Text Role for any questions or concerns.

## 2023-10-12 NOTE — DISCHARGE INSTR - AVS FIRST PAGE
Thyroidectomy   WHAT YOU NEED TO KNOW:   Thyroidectomy is surgery to remove your thyroid gland. Your thyroid is a gland in the front lower part of your neck. The thyroid makes hormones that regulate your metabolism, body temperature, and heart rate. Smaller glands called parathyroids regulate the level of calcium in your blood. You have 4 parathyroids, located on the sides of your thyroid gland. Your parathyroids will not be removed during this surgery. DISCHARGE INSTRUCTIONS:   Medicines: The following medicines have been ordered by your healthcare provider:  Pain medicine  can help take away or decrease pain. Do not wait until the pain is severe before you take your medicine. Only use the oxycodone/acetaminophen for severe pain, otherwise use just acetaminophen (Tylenol). Thyroid medicine  Has been prescribed to replace your thyroid hormone. Calcium supplements: It is important to take the calcium and  vitamin D as prescribed. Take your medicine as directed. Contact your healthcare provider if you think your medicine is not helping or if you have side effects. Tell him or her if you are allergic to any medicine. Keep a list of the medicines, vitamins, and herbs you take. Include the amounts, and when and why you take them. Bring the list or the pill bottles to follow-up visits. Carry your medicine list with you in case of an emergency. Follow up with your endocrinologist or surgeon as directed: You will need to return to have your wound checked and stitches removed. You may also need blood tests to monitor your calcium, parathyroid, and thyroid hormone levels. Write down your questions so you remember to ask them during your visits. Self-care:   Rest when you need to while you heal after surgery. Slowly start to do more each day. Return to your daily activities as directed. Wound care:  Carefully wash your skin near the incision wound area with soap and water.  Dry the area and put on new, clean bandages as directed. Change your bandages when they get wet or dirty. You can use a mild body lotion to improve the scar. Swallowing and voice changes: You may have a sore throat, hoarse voice, or difficulty swallowing after surgery. These symptoms should go away after a few days. Contact your endocrinologist or surgeon if:   You have a fever or chills. You feel very tired and cold, gain weight for no reason, and your skin is very dry. You vomit several times in a row. Your incision is red, swollen, or draining pus. You have new voice weakness or hoarseness, or it is getting worse. You have questions or concerns about your condition or care. Seek care immediately or call 911 if:   You have sudden tingling or muscle cramps in your face, arm, or leg. You have muscle spasms in your legs and feet that do not go away. Blood soaks through your bandage. Your stitches come apart. You have sudden swelling in your neck or difficulty swallowing. You have trouble breathing. You have a seizure. © 2017 [address] [place] [address] [place] Information is for End User's use only and may not be sold, redistributed or otherwise used for commercial purposes. All illustrations and images included in CareNotes® are the copyrighted property of [employer].D.A[employer]M., Inc. or [employer]. The above information is an  only. It is not intended as medical advice for individual conditions or treatments. Talk to your doctor, nurse or pharmacist before following any medical regimen to see if it is safe and effective for you. Call Dr. Jacinta Gray with any questions or concerns: office [phone]; mobile [phone] (urgent issues).

## 2023-10-17 DIAGNOSIS — N13.8 BPH WITH OBSTRUCTION/LOWER URINARY TRACT SYMPTOMS: ICD-10-CM

## 2023-10-17 DIAGNOSIS — N40.1 BPH WITH OBSTRUCTION/LOWER URINARY TRACT SYMPTOMS: ICD-10-CM

## 2023-10-17 RX ORDER — TAMSULOSIN HYDROCHLORIDE 0.4 MG/1
CAPSULE ORAL
Qty: 180 CAPSULE | Refills: 3 | Status: SHIPPED | OUTPATIENT
Start: 2023-10-17

## 2023-10-17 NOTE — TELEPHONE ENCOUNTER
Per patient directions are suppose to be one capsule by mouth twice a day. Reason for call:   [x] Refill   [] Prior Auth  [] Other:     Office:   [] PCP/Provider -   [x] Specialty/Provider - Urology    Medication: Flomax    Dose/Frequency: 0.4 mg    Quantity:     Pharm[facility]    Does the patient have enough for 3 days?    [] Yes   [x] No - Send as HP to POD

## 2023-10-31 PROCEDURE — 88307 TISSUE EXAM BY PATHOLOGIST: CPT | Performed by: PATHOLOGY

## 2023-10-31 PROCEDURE — 88342 IMHCHEM/IMCYTCHM 1ST ANTB: CPT | Performed by: PATHOLOGY

## 2023-11-06 ENCOUNTER — TELEPHONE (OUTPATIENT)
Dept: OTOLARYNGOLOGY | Facility: CLINIC | Age: 73
End: 2023-11-06

## 2023-11-06 DIAGNOSIS — E89.0 S/P TOTAL THYROIDECTOMY: ICD-10-CM

## 2023-11-06 RX ORDER — LEVOTHYROXINE SODIUM 112 UG/1
112 TABLET ORAL
Qty: 90 TABLET | Refills: 0 | Status: SHIPPED | OUTPATIENT
Start: 2023-11-06

## 2024-01-24 NOTE — PROGRESS NOTES
Laureano Marti 73 y.o. male MRN: [id number]    Encounter: [id number]      Assessment/Plan     73-year-old male with recently diagnosed papillary thyroid cancer status post total thyroidectomy October 2023, currently on levothyroxine 112 mcg daily with low normal TSH levels.     -- Patient will need PELLETIER therapy, for 4.8 cm papillary thyroid carcinoma status post thyroidectomy, encapsulated with focal capsular invasion.  No evidence of vascular invasion or extrathyroidal extension.  Ordered nuclear med evaluation.  Preliminarily counseled on PELLETIER procedures, low iodine diet, side effects.   --Will need preliminary TSH, T4, thyroglobulin prior to PELLETIER  -- Patient has been taking his levothyroxine 112 mcg appropriately however TSH remains in lower range of normal.  At this time, our goal for his TSH is below normal, around 0.3.  Counseled on hypo and hyperthyroid symptoms to be aware of. Counseled on thyroid physiology.   --Will increase levothyroxine dosing to 125 mcg daily  -- Repeat TSH and T4 in 6 weeks.  -- 3-month follow-up    CC:  papillary thyroid cancer     History of Present Illness     HPI:  Laureano Marti is a 73 y.o. male presents in consult for history of papillary thyroid cancer postresection.  Also with history of HTN, HLD, CAD s/p stenting, anxiety.     10/2022 patient underwent carotid artery Doppler that incidentally noted large left thyroid lobe vascular mass, recommended further evaluation.1/25/2023 thyroid ultrasound with enlarged homogenous thyroid, with well-defined 5.4 X3.8 X 3.6 cm solid nodule, which was biopsied on 8/21/2023 with findings of Ancona VI papillary thyroid carcinoma.  8/23/2023 ultrasound head neck lymph nodes without dangelo metastatic disease patient underwent total thyroidectomy on 10/11/2023 with Dr. Aldana ENT.   He was started on levothyroxine 112 mcg. Taking appropriately.  Morning, empty stomach, 45 minutes before food and medications.    Sister with a history of thyroid  cancer, question PELLETIER.  Patient does not know details.  No personal history of thyroid disease or head or neck radiation,    10/11/2023   A.  Thyroid (total thyroidectomy):     - Papillary thyroid carcinoma (4.8 cm, left lobe), classic subtype with prominent follicular architecture, encapsulated with focal capsular invasion.     - Two (2) additional foci of papillary microcarcinoma, classic subtype (0.1 and 0.3 cm, left lobe).     - One (1) perithyroidal lymph node, negative for carcinoma.      - Negative for extrathyroidal extension.     - Negative for lymph-vascular invasion.     12/6/2023 TSH 0.69  1/2023 TSH 1.74  9/2019 TSH 2.14    Review of Systems   Constitutional:  Positive for fatigue. Negative for activity change, appetite change and unexpected weight change.   HENT:  Negative for sore throat, trouble swallowing and voice change.    Endocrine: Positive for cold intolerance (postop). Negative for heat intolerance.   Musculoskeletal:  Positive for arthralgias (knees and ankles).   Skin:  Negative for pallor and rash.   Psychiatric/Behavioral:  Negative for agitation and confusion.        Historical Information   Past Medical History:   Diagnosis Date    Abnormal serum cholesterol     Back strain     BPH (benign prostatic hyperplasia)     CAD (coronary artery disease)     Dizziness     HL (hearing loss)     Wears hearing aids    Hyperlipidemia      Past Surgical History:   Procedure Laterality Date    ACHILLES TENDON REPAIR      ADENOIDECTOMY      CATARACT EXTRACTION Right     COLONOSCOPY      CORONARY ANGIOPLASTY WITH STENT PLACEMENT      KNEE SURGERY      CA COLONOSCOPY FLX DX W/COLLJ SPEC WHEN PFRMD N/A 10/23/2018    Procedure: COLONOSCOPY;  Surgeon: Dave Andrews MD;  Location: [facility];  Service: Gastroenterology    CA THYROIDECTOMY TOTAL/COMPLETE N/A 10/11/2023    Procedure: TOTAL THYROIDECTOMY WITH NIMS;  Surgeon: Power Aldana MD;  Location: [facility];  Service: ENT    TONSILLECTOMY      US GUIDED  THYROID BIOPSY  2023     Social History   Social History     Substance and Sexual Activity   Alcohol Use Yes    Comment: occasionally, 2-3 per week.      Social History     Substance and Sexual Activity   Drug Use No     Social History     Tobacco Use   Smoking Status Former    Current packs/day: 0.00    Types: Cigarettes    Quit date: 10/5/1990    Years since quittin.3   Smokeless Tobacco Never     Family History:   Family History   Problem Relation Age of Onset    Colon cancer Mother     Stroke Father     Thyroid cancer Sister     Prostate cancer Brother        Meds/Allergies   Current Outpatient Medications   Medication Sig Dispense Refill    atorvastatin (LIPITOR) 40 mg tablet TAKE 1 TABLET BY MOUTH DAILY AT NIGHT      Cholecalciferol 1000 units CHEW Chew      citalopram (CeleXA) 20 mg tablet Take 20 mg by mouth daily      clopidogrel (PLAVIX) 75 mg tablet Take 75 mg by mouth daily  1    Coenzyme Q10 (COQ10) 200 MG CAPS Take by mouth      diazepam (VALIUM) 5 mg tablet TAKE 1 TABLET BY MOUTH DAILY AS NEEDED      dutasteride (AVODART) 0.5 mg capsule Take 1 capsule (0.5 mg total) by mouth daily 90 capsule 0    GLUCOS-BG-MSM-WH-H-RYCU-SECU PO Take by mouth      hydrOXYzine HCL (ATARAX) 25 mg tablet TAKE 1 TABLET BY ORAL ROUTE 3 TIMES EVERY BEDTIME AS NEEDED      levothyroxine 112 mcg tablet Take 1 tablet (112 mcg total) by mouth daily in the early morning 90 tablet 0    meclizine (ANTIVERT) 25 mg tablet Take 1 tablet (25 mg total) by mouth every 8 (eight) hours as needed for dizziness 30 tablet 1    nitroglycerin (NITROSTAT) 0.4 mg SL tablet Place 0.4 mg under the tongue      Omega-3 Fatty Acids (FISH OIL) 1,000 mg Take by mouth      ondansetron (ZOFRAN) 4 mg tablet Take 1 tablet (4 mg total) by mouth every 6 (six) hours 12 tablet 0    tadalafil (Cialis) 20 MG tablet Take 1 tablet (20 mg total) by mouth as needed in the morning for erectile dysfunction. Take as directed. 30 tablet 0    tamsulosin  "(FLOMAX) 0.4 mg Take 2 capsules by mouth daily. 180 capsule 3    ondansetron (ZOFRAN-ODT) 4 mg disintegrating tablet  (Patient not taking: Reported on 1/25/2024)       No current facility-administered medications for this visit.     No Known Allergies    Objective   Vitals: Blood pressure 120/90, pulse 75, height 5' 10\" (1.778 m), weight 95.8 kg (211 lb 3.2 oz).    Physical Exam  Constitutional:       General: He is not in acute distress.     Appearance: Normal appearance. He is not ill-appearing, toxic-appearing or diaphoretic.   HENT:      Head: Normocephalic and atraumatic.      Nose: Nose normal.   Eyes:      Extraocular Movements: Extraocular movements intact.      Conjunctiva/sclera: Conjunctivae normal.      Pupils: Pupils are equal, round, and reactive to light.   Cardiovascular:      Rate and Rhythm: Normal rate.   Pulmonary:      Effort: Pulmonary effort is normal. No respiratory distress.   Abdominal:      General: There is no distension.   Musculoskeletal:         General: No deformity.      Right lower leg: No edema.      Left lower leg: No edema.   Skin:     General: Skin is warm and dry.   Neurological:      General: No focal deficit present.      Mental Status: He is alert. Mental status is at baseline.   Psychiatric:         Mood and Affect: Mood normal.         Behavior: Behavior normal.         Thought Content: Thought content normal.         The history was obtained from the review of the chart, patient.    Lab Results:        Imaging Studies:       I have personally reviewed pertinent reports.      Portions of the record may have been created with voice recognition software. Occasional wrong word or \"sound a like\" substitutions may have occurred due to the inherent limitations of voice recognition software. Read the chart carefully and recognize, using context, where substitutions have occurred.  "

## 2024-01-25 ENCOUNTER — CONSULT (OUTPATIENT)
Dept: ENDOCRINOLOGY | Facility: CLINIC | Age: 74
End: 2024-01-25
Payer: COMMERCIAL

## 2024-01-25 VITALS
DIASTOLIC BLOOD PRESSURE: 90 MMHG | BODY MASS INDEX: 30.24 KG/M2 | WEIGHT: 211.2 LBS | HEART RATE: 75 BPM | SYSTOLIC BLOOD PRESSURE: 120 MMHG | HEIGHT: 70 IN

## 2024-01-25 DIAGNOSIS — E89.0 S/P TOTAL THYROIDECTOMY: ICD-10-CM

## 2024-01-25 DIAGNOSIS — C73 PAPILLARY THYROID CARCINOMA (HCC): Primary | ICD-10-CM

## 2024-01-25 PROCEDURE — 99204 OFFICE O/P NEW MOD 45 MIN: CPT | Performed by: INTERNAL MEDICINE

## 2024-01-25 RX ORDER — LEVOTHYROXINE SODIUM 0.12 MG/1
125 TABLET ORAL DAILY
Qty: 30 TABLET | Refills: 3 | Status: SHIPPED | OUTPATIENT
Start: 2024-01-25

## 2024-01-25 NOTE — PATIENT INSTRUCTIONS
Look at [email or web address]    Increase levothyroxine to 125mcg    Get your labs done . MUST be before ablation.     Repeat labs (TSH and T4) in 6 weeks    Schedule the radioactive iodine ablation

## 2024-02-08 ENCOUNTER — HOSPITAL ENCOUNTER (OUTPATIENT)
Dept: RADIOLOGY | Age: 74
Discharge: HOME | End: 2024-02-08

## 2024-02-08 DIAGNOSIS — C73 MALIGNANT NEOPLASM OF THYROID GLAND (HCC): ICD-10-CM

## 2024-02-12 DIAGNOSIS — N40.1 BENIGN PROSTATIC HYPERPLASIA WITH LOWER URINARY TRACT SYMPTOMS, SYMPTOM DETAILS UNSPECIFIED: ICD-10-CM

## 2024-02-12 RX ORDER — DUTASTERIDE 0.5 MG/1
0.5 CAPSULE, LIQUID FILLED ORAL DAILY
Qty: 90 CAPSULE | Refills: 0 | Status: SHIPPED | OUTPATIENT
Start: 2024-02-12 | End: 2024-05-12

## 2024-02-12 NOTE — TELEPHONE ENCOUNTER
Reason for call:   [x] Refill   [] Prior Auth  [] Other:     Office:   [] PCP/Provider -   [x] Specialty/Provider - Urology    Medication:   Dutasteride 0.5mg- take 1 capsule by mouth daily      Pharmacy: [facility]    Does the patient have enough for 3 days?   [] Yes   [x] No - Send as HP to POD

## 2024-02-14 ENCOUNTER — TELEPHONE (OUTPATIENT)
Age: 74
End: 2024-02-14

## 2024-02-14 NOTE — TELEPHONE ENCOUNTER
PA for Avodart    Submitted via  [x]CMM-KEY  [id number]  []Surescripts-Case ID #   []Faxed to plan   []Other website   []Phone call Case ID #     Office notes sent, clinical questions answered. Awaiting determination

## 2024-02-14 NOTE — TELEPHONE ENCOUNTER
Reason for call:   [x] Prior Auth  [] Other:     Caller:  [x] Patient  [] Pharmacy  Name:   Address:   Callback Number:     Medication: dutasteride (AVODART) 0.5 mg capsule     Dose/Frequency: 0.5 mg, Oral, Daily     Quantity: 90    Ordering Provider:   [] PCP/Provider -   [x] Speciality/Provider - urology

## 2024-02-14 NOTE — TELEPHONE ENCOUNTER
PA for Avodart Approved   Date(s) approved 01/15/2024-02/13/2025      Patient advised by [x] [employer] Message                      [] Phone call       Pharmacy advised by [x]Fax                                     []Phone call    Approval letter scanned into Media No

## 2024-02-19 ENCOUNTER — TELEPHONE (OUTPATIENT)
Dept: ENDOCRINOLOGY | Facility: CLINIC | Age: 74
End: 2024-02-19

## 2024-02-19 DIAGNOSIS — E89.0 S/P TOTAL THYROIDECTOMY: Primary | ICD-10-CM

## 2024-02-20 ENCOUNTER — TELEPHONE (OUTPATIENT)
Dept: ENDOCRINOLOGY | Facility: CLINIC | Age: 74
End: 2024-02-20

## 2024-02-20 NOTE — TELEPHONE ENCOUNTER
Called patient back regarding his thyroid labs  Unfortunately, he got them at 3 weeks rather than 6 weeks so it is difficult to interpret the TSH and free T4 levels    2/14/24. 3w after dose change. Goal TSH 0.3  TSH 0.66  fT4 1.15  TG 0.47  TGAB <1    Updated patient on labs and meanings  He does feel somewhat increased anxiety, constipation and vertigo since the higher dose of levothyroxine, doubt that it is directly related  Asked patient to repeat TSH and free T4 around March 6, which would be 6 weeks from his consult  Has scheduled his PELLETIER ablation for mid March.

## 2024-02-20 NOTE — TELEPHONE ENCOUNTER
I called [facility] and no auth is required for  x2 Reference # [id number]. I also confirmed for [id number] and [id number] no auth is required.   [id number] is required and has been approved, please see approval scanned under media.

## 2024-03-18 ENCOUNTER — HOSPITAL ENCOUNTER (OUTPATIENT)
Dept: RADIOLOGY | Age: 74
Discharge: HOME | End: 2024-03-18
Payer: COMMERCIAL

## 2024-03-18 DIAGNOSIS — C73 MALIGNANT NEOPLASM OF THYROID GLAND (HCC): ICD-10-CM

## 2024-03-18 PROCEDURE — 96372 THER/PROPH/DIAG INJ SC/IM: CPT

## 2024-03-18 PROCEDURE — 78018 THYROID MET IMAGING BODY: CPT

## 2024-03-18 RX ADMIN — THYROTROPIN ALFA 0.9 MG: 0.9 INJECTION, POWDER, FOR SOLUTION INTRAMUSCULAR at 08:15

## 2024-03-19 ENCOUNTER — APPOINTMENT (OUTPATIENT)
Dept: RADIOLOGY | Age: 74
End: 2024-03-19
Payer: COMMERCIAL

## 2024-03-19 ENCOUNTER — HOSPITAL ENCOUNTER (OUTPATIENT)
Dept: RADIOLOGY | Age: 74
Discharge: HOME | End: 2024-03-19
Payer: COMMERCIAL

## 2024-03-19 RX ADMIN — THYROTROPIN ALFA 0.9 MG: 0.9 INJECTION, POWDER, FOR SOLUTION INTRAMUSCULAR at 08:15

## 2024-03-20 ENCOUNTER — APPOINTMENT (OUTPATIENT)
Dept: RADIOLOGY | Age: 74
End: 2024-03-20
Payer: COMMERCIAL

## 2024-03-25 ENCOUNTER — APPOINTMENT (OUTPATIENT)
Dept: RADIOLOGY | Age: 74
End: 2024-03-25
Payer: COMMERCIAL

## 2024-03-25 ENCOUNTER — HOSPITAL ENCOUNTER (OUTPATIENT)
Dept: RADIOLOGY | Age: 74
Discharge: HOME | End: 2024-03-25
Payer: COMMERCIAL

## 2024-03-25 DIAGNOSIS — C73 MALIGNANT NEOPLASM OF THYROID GLAND (HCC): ICD-10-CM

## 2024-03-25 PROCEDURE — 96372 THER/PROPH/DIAG INJ SC/IM: CPT

## 2024-03-25 PROCEDURE — 78018 THYROID MET IMAGING BODY: CPT

## 2024-03-25 PROCEDURE — A9509 IODINE I-123 SOD IODIDE MIL: HCPCS

## 2024-03-25 RX ADMIN — THYROTROPIN ALFA 0.9 MG: 0.9 INJECTION, POWDER, FOR SOLUTION INTRAMUSCULAR at 08:25

## 2024-03-26 ENCOUNTER — HOSPITAL ENCOUNTER (OUTPATIENT)
Dept: RADIOLOGY | Age: 74
Discharge: HOME | End: 2024-03-26
Payer: COMMERCIAL

## 2024-03-26 RX ADMIN — THYROTROPIN ALFA 0.9 MG: 0.9 INJECTION, POWDER, FOR SOLUTION INTRAMUSCULAR at 08:10

## 2024-03-27 ENCOUNTER — HOSPITAL ENCOUNTER (OUTPATIENT)
Dept: RADIOLOGY | Age: 74
Discharge: HOME | End: 2024-03-27
Payer: COMMERCIAL

## 2024-03-27 ENCOUNTER — APPOINTMENT (OUTPATIENT)
Dept: LAB | Age: 74
End: 2024-03-27
Payer: COMMERCIAL

## 2024-03-27 DIAGNOSIS — C73 PAPILLARY THYROID CARCINOMA (HCC): ICD-10-CM

## 2024-03-27 DIAGNOSIS — E89.0 S/P TOTAL THYROIDECTOMY: ICD-10-CM

## 2024-03-27 LAB
T4 FREE SERPL-MCNC: 1.1 NG/DL (ref 0.61–1.12)
TSH SERPL DL<=0.05 MIU/L-ACNC: 84.81 UIU/ML (ref 0.45–4.5)

## 2024-03-27 PROCEDURE — 84443 ASSAY THYROID STIM HORMONE: CPT

## 2024-03-27 PROCEDURE — 84439 ASSAY OF FREE THYROXINE: CPT

## 2024-03-27 PROCEDURE — 36415 COLL VENOUS BLD VENIPUNCTURE: CPT

## 2024-03-27 PROCEDURE — 86800 THYROGLOBULIN ANTIBODY: CPT

## 2024-03-27 PROCEDURE — 79005 NUCLEAR RX ORAL ADMIN: CPT

## 2024-03-27 PROCEDURE — A9517 I131 IODIDE CAP, RX: HCPCS

## 2024-03-27 PROCEDURE — 84432 ASSAY OF THYROGLOBULIN: CPT

## 2024-03-29 LAB
THYROGLOB AB SERPL-ACNC: <1 IU/ML (ref 0–0.9)
THYROGLOB SERPL-MCNC: 4.4 NG/ML (ref 1.4–29.2)

## 2024-04-01 ENCOUNTER — HOSPITAL ENCOUNTER (OUTPATIENT)
Dept: RADIOLOGY | Age: 74
Discharge: HOME | End: 2024-04-01
Payer: COMMERCIAL

## 2024-04-01 DIAGNOSIS — C73 MALIGNANT NEOPLASM OF THYROID GLAND (HCC): ICD-10-CM

## 2024-04-01 PROCEDURE — 78018 THYROID MET IMAGING BODY: CPT

## 2024-04-02 ENCOUNTER — TELEPHONE (OUTPATIENT)
Dept: ENDOCRINOLOGY | Facility: CLINIC | Age: 74
End: 2024-04-02

## 2024-04-02 DIAGNOSIS — C73 PAPILLARY THYROID CARCINOMA (HCC): Primary | ICD-10-CM

## 2024-04-02 NOTE — TELEPHONE ENCOUNTER
Goal TSH 0.3  Levothyroxine 125mcg since 1/25/24    Patient just completed PELLETIER, just felt some salivary gland soreness  Did get thyroid lab prior to PELLETIER but unfortunately after Thyrogen stimulation  TSH 84, thyroglobulin antibody negative, thyroglobulin 4.4, free T41.10  TSH elevated as expected following Thyrogen  Thyroglobulin effective is a stimulated level  fT4    Cannot interpret TSH in regards to post op TSH goal, but free T4 reassuringly high range normal   Repeat labs  6-8w, right before fu   Fu 5/23

## 2024-04-07 DIAGNOSIS — N40.1 BENIGN PROSTATIC HYPERPLASIA WITH LOWER URINARY TRACT SYMPTOMS, SYMPTOM DETAILS UNSPECIFIED: ICD-10-CM

## 2024-04-08 RX ORDER — DUTASTERIDE 0.5 MG/1
0.5 CAPSULE, LIQUID FILLED ORAL DAILY
Qty: 90 CAPSULE | Refills: 1 | Status: SHIPPED | OUTPATIENT
Start: 2024-04-08

## 2024-05-16 LAB
T4 FREE SERPL-MCNC: 1.07 NG/DL (ref 0.61–1.12)
THYROGLOB AB SERPL-ACNC: 0.13 NG/ML (ref 1.59–50.03)
THYROGLOB AB SERPL-ACNC: <1 IU/ML
TSH SERPL-ACNC: 0.25 UIU/ML (ref 0.45–5.33)

## 2024-05-22 NOTE — PROGRESS NOTES
Laureano Marti 73 y.o. male MRN: [id number]    Encounter: [id number]      Assessment & Plan     73 y.o. male with 4.8 cm papillary thyroid cancer status post total thyroidectomy October 2023, post PELLETIER March 2024. Currently on levothyroxine 125 mcg daily with TSH levels at goal (0.3) and with drop in thyroglobulin levels from 4.4 pre-PELLETIER to 0.1, excellent response.    --Will repeat TSH, T4, thyroglobulin in 3 months  -- Patient has been taking his levothyroxine 125 mcg appropriately and TSH is at goal , which is TSH is below normal, around 0.3.    --Counseled on hyperthyroid symptoms to be aware of. Counseled on thyroid axis physiology.  -- Would anticipate repeat imaging around 1 year.  -- 6-month follow-up    CC:  papillary thyroid cancer fu    History of Present Illness     HPI:  Laureano Marti is a 73 y.o. male presents in follow up for history of papillary thyroid cancer postresection.  Also with history of HTN, HLD, CAD s/p stenting, anxiety.  Last seen in office January 2024 in original consult, at which time levothyroxine was increased to 125 mcg daily and PELLETIER was ordered.    10/2022 patient underwent carotid artery Doppler that incidentally noted large left thyroid lobe vascular mass.1/25/2023 thyroid ultrasound with enlarged homogenous thyroid, with well-defined 5.4 X3.8 X 3.6 cm solid nodule, which was biopsied on 8/21/2023 with findings of [place] VI papillary thyroid carcinoma.  8/23/2023 ultrasound head neck lymph nodes without dangelo metastatic disease patient underwent total thyroidectomy on 10/11/2023 with Dr. Aldana ENT.  He underwent PELLETIER 3/2024. No metastatic disease on scan.    He remains on levothyroxine 125 mcg. Taking appropriately.  Morning, empty stomach, 45-1hr minutes before food and medications.    Sister with a history of thyroid cancer, question PELLETIER.  Patient does not know details.  No personal history of thyroid disease or head or neck radiation,    5/16/2024 TSH 0.25, free T4 1.07  Thyroglobulin 0.13 thyroglobulin antibody negative  3/27/2024 post Thyrogen administration pre-PELLETIER TSH 84 Free T41.1 Thyroglobulin 4.4 Thyroglobulin antibody negative  12/6/2023 TSH 0.69 levothyroxine increased to 125 mcg  10/2023 total thyroidectomy levothyroxine initiated 112 mcg  1/2023 TSH 1.74  9/2019 TSH 2.14    10/11/2023   A.  Thyroid (total thyroidectomy):     - Papillary thyroid carcinoma (4.8 cm, left lobe), classic subtype with prominent follicular architecture, encapsulated with focal capsular invasion.     - Two (2) additional foci of papillary microcarcinoma, classic subtype (0.1 and 0.3 cm, left lobe).     - One (1) perithyroidal lymph node, negative for carcinoma.      - Negative for extrathyroidal extension.     - Negative for lymph-vascular invasion.         Review of Systems   Constitutional:  Negative for activity change, appetite change, fatigue and unexpected weight change.   HENT:  Negative for sore throat, trouble swallowing and voice change.    Gastrointestinal:         Some more discomfort/alt diarrhea   Endocrine: Negative for cold intolerance (postop) and heat intolerance.   Musculoskeletal:  Positive for arthralgias (knees and ankles).   Skin:  Negative for pallor and rash.   Neurological:  Negative for tremors.   Psychiatric/Behavioral:  Negative for agitation and confusion. The patient is not nervous/anxious.        Historical Information   Past Medical History:   Diagnosis Date    Abnormal serum cholesterol     Back strain     BPH (benign prostatic hyperplasia)     CAD (coronary artery disease)     Dizziness     HL (hearing loss)     Wears hearing aids    Hyperlipidemia      Past Surgical History:   Procedure Laterality Date    ACHILLES TENDON REPAIR      ADENOIDECTOMY      CATARACT EXTRACTION Right     COLONOSCOPY      CORONARY ANGIOPLASTY WITH STENT PLACEMENT      KNEE SURGERY      NE COLONOSCOPY FLX DX W/COLLJ SPEC WHEN PFRMD N/A 10/23/2018    Procedure: COLONOSCOPY;  Surgeon:  Dave Andrews MD;  Location: [facility];  Service: Gastroenterology    [place] THYROIDECTOMY TOTAL/COMPLETE N/A 10/11/2023    Procedure: TOTAL THYROIDECTOMY WITH NIMS;  Surgeon: Power Aldana MD;  Location: [facility];  Service: ENT    TONSILLECTOMY      US GUIDED THYROID BIOPSY  2023     Social History   Social History     Substance and Sexual Activity   Alcohol Use Yes    Comment: occasionally, 2-3 per week.      Social History     Substance and Sexual Activity   Drug Use No     Social History     Tobacco Use   Smoking Status Former    Current packs/day: 0.00    Types: Cigarettes    Quit date: 10/5/1990    Years since quittin.6   Smokeless Tobacco Never     Family History:   Family History   Problem Relation Age of Onset    Colon cancer Mother     Stroke Father     Thyroid cancer Sister     Prostate cancer Brother        Meds/Allergies   Current Outpatient Medications   Medication Sig Dispense Refill    atorvastatin (LIPITOR) 40 mg tablet TAKE 1 TABLET BY MOUTH DAILY AT NIGHT      citalopram (CeleXA) 20 mg tablet Take 20 mg by mouth daily      clopidogrel (PLAVIX) 75 mg tablet Take 75 mg by mouth daily  1    Coenzyme Q10 (COQ10) 200 MG CAPS Take by mouth      diazepam (VALIUM) 5 mg tablet TAKE 1 TABLET BY MOUTH DAILY AS NEEDED      dutasteride (AVODART) 0.5 mg capsule TAKE 1 CAPSULE BY MOUTH EVERY DAY 90 capsule 1    GLUCOS-BG-MSM-MM-V-RXRO-SECU PO Take by mouth      hydrOXYzine HCL (ATARAX) 25 mg tablet TAKE 1 TABLET BY ORAL ROUTE 3 TIMES EVERY BEDTIME AS NEEDED      levothyroxine (Synthroid) 125 mcg tablet Take 1 tablet (125 mcg total) by mouth daily 30 tablet 3    nitroglycerin (NITROSTAT) 0.4 mg SL tablet Place 0.4 mg under the tongue      Omega-3 Fatty Acids (FISH OIL) 1,000 mg Take by mouth      ondansetron (ZOFRAN) 4 mg tablet Take 1 tablet (4 mg total) by mouth every 6 (six) hours 12 tablet 0    tadalafil (Cialis) 20 MG tablet Take 1 tablet (20 mg total) by mouth as needed in the morning for  "erectile dysfunction. Take as directed. 30 tablet 0    tamsulosin (FLOMAX) 0.4 mg Take 2 capsules by mouth daily. 180 capsule 3    Cholecalciferol 1000 units CHEW Chew (Patient not taking: Reported on 5/23/2024)      meclizine (ANTIVERT) 25 mg tablet Take 1 tablet (25 mg total) by mouth every 8 (eight) hours as needed for dizziness 30 tablet 1     No current facility-administered medications for this visit.     No Known Allergies    Objective   Vitals: Blood pressure 130/80, pulse 70, height 5' 10\" (1.778 m), weight 92 kg (202 lb 12.8 oz), SpO2 99%.    Physical Exam  Constitutional:       General: He is not in acute distress.     Appearance: Normal appearance. He is not ill-appearing, toxic-appearing or diaphoretic.   HENT:      Head: Normocephalic and atraumatic.      Nose: Nose normal.   Eyes:      Extraocular Movements: Extraocular movements intact.      Conjunctiva/sclera: Conjunctivae normal.      Pupils: Pupils are equal, round, and reactive to light.   Neck:      Thyroid: No thyroid mass, thyromegaly or thyroid tenderness.      Comments: Well-healed transverse scar  Cardiovascular:      Rate and Rhythm: Normal rate.   Pulmonary:      Effort: Pulmonary effort is normal. No respiratory distress.   Abdominal:      General: There is no distension.   Musculoskeletal:         General: No deformity.      Right lower leg: No edema.      Left lower leg: No edema.   Skin:     General: Skin is warm and dry.   Neurological:      General: No focal deficit present.      Mental Status: He is alert. Mental status is at baseline.   Psychiatric:         Mood and Affect: Mood normal.         Behavior: Behavior normal.         Thought Content: Thought content normal.         The history was obtained from the review of the chart, patient.    Lab Results:   Lab Results   Component Value Date/Time    TSH 3RD [facility] 84.806 (H) 03/27/2024 01:53 PM    Free T4 1.10 03/27/2024 01:53 PM    Free t4 1.07 05/16/2024 11:47 AM " "      Imaging Studies:       I have personally reviewed pertinent reports.      Portions of the record may have been created with voice recognition software. Occasional wrong word or \"sound a like\" substitutions may have occurred due to the inherent limitations of voice recognition software. Read the chart carefully and recognize, using context, where substitutions have occurred.  "

## 2024-05-23 ENCOUNTER — OFFICE VISIT (OUTPATIENT)
Dept: ENDOCRINOLOGY | Facility: CLINIC | Age: 74
End: 2024-05-23
Payer: COMMERCIAL

## 2024-05-23 VITALS
HEART RATE: 70 BPM | OXYGEN SATURATION: 99 % | BODY MASS INDEX: 29.03 KG/M2 | HEIGHT: 70 IN | DIASTOLIC BLOOD PRESSURE: 80 MMHG | SYSTOLIC BLOOD PRESSURE: 130 MMHG | WEIGHT: 202.8 LBS

## 2024-05-23 DIAGNOSIS — E89.0 POSTOPERATIVE HYPOTHYROIDISM: ICD-10-CM

## 2024-05-23 DIAGNOSIS — C73 PAPILLARY THYROID CARCINOMA (HCC): Primary | ICD-10-CM

## 2024-05-23 PROCEDURE — 99214 OFFICE O/P EST MOD 30 MIN: CPT | Performed by: INTERNAL MEDICINE

## 2024-07-03 ENCOUNTER — OFFICE VISIT (OUTPATIENT)
Dept: UROLOGY | Facility: CLINIC | Age: 74
End: 2024-07-03
Payer: COMMERCIAL

## 2024-07-03 VITALS
DIASTOLIC BLOOD PRESSURE: 70 MMHG | WEIGHT: 202 LBS | OXYGEN SATURATION: 98 % | HEIGHT: 70 IN | BODY MASS INDEX: 28.92 KG/M2 | SYSTOLIC BLOOD PRESSURE: 120 MMHG | HEART RATE: 83 BPM

## 2024-07-03 DIAGNOSIS — N40.1 BENIGN PROSTATIC HYPERPLASIA WITH LOWER URINARY TRACT SYMPTOMS, SYMPTOM DETAILS UNSPECIFIED: Primary | ICD-10-CM

## 2024-07-03 LAB — POST-VOID RESIDUAL VOLUME, ML POC: 55 ML

## 2024-07-03 PROCEDURE — 99213 OFFICE O/P EST LOW 20 MIN: CPT | Performed by: PHYSICIAN ASSISTANT

## 2024-07-03 PROCEDURE — 51798 US URINE CAPACITY MEASURE: CPT | Performed by: PHYSICIAN ASSISTANT

## 2024-07-03 RX ORDER — DUTASTERIDE 0.5 MG/1
0.5 CAPSULE, LIQUID FILLED ORAL DAILY
Qty: 90 CAPSULE | Refills: 3 | Status: SHIPPED | OUTPATIENT
Start: 2024-07-03

## 2024-07-03 NOTE — PROGRESS NOTES
UROLOGY PROGRESS NOTE   Patient Identifiers: Laureano Marti (MRN [id number])  Date of Service: 7/3/2024    Subjective:   73-year-old man history of elevated PSA.  He has been on dutasteride and tamsulosin.  He has had at least 3 biopsies in 2007, 2013 and 2017.  He had a consultation at [facility] with an MRI showing PI-RADS 2.  Recent PSA 1.98.  He has some lower urinary tract symptoms with slow flow and not feeling fully empty.  Cystoscopy in the past showed lateral lobe hypertrophy with prominent median tissue.  Prostate size was 92 g on his previous MRI.  Has had urinary retention on occasion.    Reason for visit: BPH follow-up    Objective:     VITALS:    There were no vitals filed for this visit.  AUA SYMPTOM SCORE      Flowsheet Row Most Recent Value   AUA SYMPTOM SCORE    How often have you had a sensation of not emptying your bladder completely after you finished urinating? 1 (P)     How often have you had to urinate again less than two hours after you finished urinating? 2 (P)     How often have you found you stopped and started again several times when you urinate? 2 (P)     How often have you found it difficult to postpone urination? 0 (P)     How often have you had a weak urinary stream? 3 (P)     How often have you had to push or strain to begin urination? 1 (P)     How many times did you most typically get up to urinate from the time you went to bed at night until the time you got up in the morning? 2 (P)     Quality of Life: If you were to spend the rest of your life with your urinary condition just the way it is now, how would you feel about that? 3 (P)     AUA SYMPTOM SCORE 11 (P)                LABS:  Lab Results   Component Value Date    HGB 13.9 06/29/2022    HCT 41.2 06/29/2022    WBC 8.27 06/29/2022     06/29/2022   ]    Lab Results   Component Value Date    K 4.2 10/09/2023     10/09/2023    CO2 29 10/09/2023    BUN 19 10/09/2023    CREATININE 1.12 10/09/2023    CALCIUM 9.6  10/09/2023   ]        INPATIENT MEDS:    Current Outpatient Medications:     atorvastatin (LIPITOR) 40 mg tablet, TAKE 1 TABLET BY MOUTH DAILY AT NIGHT, Disp: , Rfl:     Cholecalciferol 1000 units CHEW, Chew (Patient not taking: Reported on 5/23/2024), Disp: , Rfl:     citalopram (CeleXA) 20 mg tablet, Take 20 mg by mouth daily, Disp: , Rfl:     clopidogrel (PLAVIX) 75 mg tablet, Take 75 mg by mouth daily, Disp: , Rfl: 1    Coenzyme Q10 (COQ10) 200 MG CAPS, Take by mouth, Disp: , Rfl:     diazepam (VALIUM) 5 mg tablet, TAKE 1 TABLET BY MOUTH DAILY AS NEEDED, Disp: , Rfl:     dutasteride (AVODART) 0.5 mg capsule, TAKE 1 CAPSULE BY MOUTH EVERY DAY, Disp: 90 capsule, Rfl: 1    GLUCOS-BG-MSM-NU-Z-ZSMD-SECU PO, Take by mouth, Disp: , Rfl:     hydrOXYzine HCL (ATARAX) 25 mg tablet, TAKE 1 TABLET BY ORAL ROUTE 3 TIMES EVERY BEDTIME AS NEEDED, Disp: , Rfl:     levothyroxine (Synthroid) 125 mcg tablet, Take 1 tablet (125 mcg total) by mouth daily, Disp: 30 tablet, Rfl: 3    meclizine (ANTIVERT) 25 mg tablet, Take 1 tablet (25 mg total) by mouth every 8 (eight) hours as needed for dizziness, Disp: 30 tablet, Rfl: 1    nitroglycerin (NITROSTAT) 0.4 mg SL tablet, Place 0.4 mg under the tongue, Disp: , Rfl:     Omega-3 Fatty Acids (FISH OIL) 1,000 mg, Take by mouth, Disp: , Rfl:     ondansetron (ZOFRAN) 4 mg tablet, Take 1 tablet (4 mg total) by mouth every 6 (six) hours, Disp: 12 tablet, Rfl: 0    tadalafil (Cialis) 20 MG tablet, Take 1 tablet (20 mg total) by mouth as needed in the morning for erectile dysfunction. Take as directed., Disp: 30 tablet, Rfl: 0    tamsulosin (FLOMAX) 0.4 mg, Take 2 capsules by mouth daily., Disp: 180 capsule, Rfl: 3      Physical Exam:   There were no vitals taken for this visit.  GEN: no acute distress    RESP: breathing comfortably with no accessory muscle use    ABD: soft, non-tender, non-distended   INCISION:    EXT: no significant peripheral edema   (Male): Penis circumcised, phallus  normal, meatus patent.  Testicles descended into scrotum bilaterally without masses nor tenderness.  No inguinal hernias bilaterally.  INO: Prostate is enlarged at 50+ grams. The prostate is not boggy. The prostate is not tender.  No nodules noted      RADIOLOGY:   none     Assessment:   #1.  BPH with obstruction    Plan:   -Continue dutasteride and tamsulosin 0.8  -Discussed options of management including repeating cystoscopy and TRUS  -Reviewed HoLEP, robotic simple prostatectomy, aqua ablation and Rezum which we do not do we also discussed consult to interventional radiology for prostate artery embolization  -Follow-up in 1 year with PSA prior to visit

## 2024-07-26 DIAGNOSIS — E89.0 S/P TOTAL THYROIDECTOMY: ICD-10-CM

## 2024-07-26 RX ORDER — LEVOTHYROXINE SODIUM 0.12 MG/1
125 TABLET ORAL DAILY
Qty: 100 TABLET | Refills: 1 | Status: SHIPPED | OUTPATIENT
Start: 2024-07-26

## 2024-07-26 NOTE — TELEPHONE ENCOUNTER
Reason for call:   [x] Refill   [] Prior Auth  [] Other:     Office:   [] PCP/Provider -   [x] Specialty/Provider - [facility]     Medication: levothyroxine (Synthroid) 125 mcg 1 tablet daily -Needs 90 days       Pharmacy: [facility] [address]     Does the patient have enough for 3 days?   [] Yes   [x] No - Send as HP to POD

## 2024-09-05 LAB
THYROGLOB AB SERPL-ACNC: <0.1 NG/ML (ref 1.59–50.03)
THYROGLOB AB SERPL-ACNC: <1 IU/ML
TSH SERPL-ACNC: 0.57 UIU/ML (ref 0.45–5.33)

## 2024-09-06 DIAGNOSIS — E89.0 POSTSURGICAL HYPOTHYROIDISM: ICD-10-CM

## 2024-09-06 DIAGNOSIS — C73 PAPILLARY THYROID CARCINOMA (HCC): Primary | ICD-10-CM

## 2024-09-25 DIAGNOSIS — N40.1 BPH WITH OBSTRUCTION/LOWER URINARY TRACT SYMPTOMS: ICD-10-CM

## 2024-09-25 DIAGNOSIS — N13.8 BPH WITH OBSTRUCTION/LOWER URINARY TRACT SYMPTOMS: ICD-10-CM

## 2024-09-25 RX ORDER — TAMSULOSIN HYDROCHLORIDE 0.4 MG/1
CAPSULE ORAL
Qty: 180 CAPSULE | Refills: 1 | Status: SHIPPED | OUTPATIENT
Start: 2024-09-25

## 2024-11-18 PROBLEM — J20.9 ACUTE BRONCHITIS: Status: RESOLVED | Noted: 2018-02-12 | Resolved: 2024-11-18

## 2024-11-18 PROBLEM — E89.0 POSTOPERATIVE HYPOTHYROIDISM: Status: ACTIVE | Noted: 2024-11-18

## 2024-11-18 NOTE — PROGRESS NOTES
Laureano Marti 74 y.o. male MRN: [id number]    Encounter: [id number]      Assessment & Plan     74 y.o. male with 4.8 cm papillary thyroid cancer status post total thyroidectomy October 2023, post PELLETIER March 2024.    1. Papillary thyroid carcinoma (HCC)  Assessment & Plan:  -- Thyroglobulin is undetectable September 2024  -- Patient has been taking his levothyroxine 125 mcg appropriately and TSH is at goal , which is TSH is low normal or below normal  --Counseled on hypothyyroid/hyperthyroid symptoms to be aware of. Counseled on thyroid axis physiology.     -- Will obtain ultrasound, patient no longer following with ENT  -- Anticipate annual thyroglobulin checks  --will updated thyroid function in next month     Orders:  -     TSH + Free T4; Future  -     US head neck lymph node mapping; Future; Expected date: 11/21/2024  2. Postoperative hypothyroidism  -     TSH + Free T4; Future  3. S/P total thyroidectomy  -     levothyroxine (Synthroid) 125 mcg tablet; Take 1 tablet (125 mcg total) by mouth daily    4m fu     C:  papillary thyroid cancer fu    History of Present Illness     HPI:  Laureano Marti is a 74 y.o. male presents in follow up for history of papillary thyroid cancer postresection.  Also with history of HTN, HLD, CAD s/p stenting, anxiety.  Last seen in follow-up May 2024.    10/2022 patient underwent carotid artery Doppler that incidentally noted large left thyroid lobe vascular mass.1/25/2023 thyroid ultrasound with enlarged homogenous thyroid, with well-defined 5.4 X3.8 X 3.6 cm solid nodule, which was biopsied on 8/21/2023 with findings of [place] VI papillary thyroid carcinoma.  8/23/2023 ultrasound head neck lymph nodes without dangelo metastatic disease patient underwent total thyroidectomy on 10/11/2023 with Dr. Aldana ENT.  He underwent PELLETIER with 124.6 mCi  3/2024. No metastatic disease on scan.    He remains on levothyroxine 125 mcg. Taking appropriately.  Morning, empty stomach, 45-1hr minutes  before food and medications.  Labs last updated September, did not update prior to appointment.  Last imaging ultrasound head neck lymph node mapping August 2023 was performed prior to thyroidectomy. Not following w ENT for cancer, but for  auditory issues.     9/5/2024 TSH 0.57, thyroglobulin <0.01, thyroglobulin antibody negative  5/16/2024 TSH 0.25, free T4 1.07 Thyroglobulin 0.13 thyroglobulin antibody negative  3/27/2024 post Thyrogen administration pre-PELLETIER TSH 84 Free T4 1.1 Thyroglobulin 4.4 Thyroglobulin antibody negative  12/6/2023 TSH 0.69 levothyroxine increased to 125 mcg  10/2023 total thyroidectomy levothyroxine initiated 112 mcg  1/2023 TSH 1.74  9/2019 TSH 2.14     10/11/2023   A.  Thyroid (total thyroidectomy):     - Papillary thyroid carcinoma (4.8 cm, left lobe), classic subtype with prominent follicular architecture, encapsulated with focal capsular invasion.     - Two (2) additional foci of papillary microcarcinoma, classic subtype (0.1 and 0.3 cm, left lobe).     - One (1) perithyroidal lymph node, negative for carcinoma.      - Negative for extrathyroidal extension.     - Negative for lymph-vascular invasion.     Review of Systems   Constitutional:  Negative for activity change, appetite change, fatigue and unexpected weight change.   Endocrine: Negative for cold intolerance and heat intolerance.       Historical Information   Past Medical History:   Diagnosis Date    Abnormal serum cholesterol     Back strain     BPH (benign prostatic hyperplasia)     CAD (coronary artery disease)     Dizziness     HL (hearing loss)     Wears hearing aids    Hyperlipidemia      Past Surgical History:   Procedure Laterality Date    ACHILLES TENDON REPAIR      ADENOIDECTOMY      CATARACT EXTRACTION Right     COLONOSCOPY      CORONARY ANGIOPLASTY WITH STENT PLACEMENT      KNEE SURGERY      [place] COLONOSCOPY FLX DX W/COLLJ SPEC WHEN PFRMD N/A 10/23/2018    Procedure: COLONOSCOPY;  Surgeon: Dave Andrews MD;   [facility];  Service: Gastroenterology    MN THYROIDECTOMY TOTAL/COMPLETE N/A 10/11/2023    Procedure: TOTAL THYROIDECTOMY WITH NIMS;  Surgeon: Power Aldana MD;  Location[facility];  Service: ENT    TONSILLECTOMY      US GUIDED THYROID BIOPSY  2023     Social History   Social History     Substance and Sexual Activity   Alcohol Use Yes    Alcohol/week: 4.0 standard drinks of alcohol    Types: 4 Glasses of wine per week    Comment: occasionally, 2-3 per week.      Social History     Substance and Sexual Activity   Drug Use No     Social History     Tobacco Use   Smoking Status Former    Current packs/day: 0.00    Average packs/day: 1 pack/day for 15.0 years (15.0 ttl pk-yrs)    Types: Cigarettes    Quit date: 10/5/1990    Years since quittin.1   Smokeless Tobacco Never     Family History:   Family History   Problem Relation Age of Onset    Colon cancer Mother     Stroke Father     Thyroid cancer Sister     Prostate cancer Brother     Thyroid disease unspecified Sister        Meds/Allergies   Current Outpatient Medications   Medication Sig Dispense Refill    atorvastatin (LIPITOR) 40 mg tablet TAKE 1 TABLET BY MOUTH DAILY AT NIGHT      citalopram (CeleXA) 20 mg tablet Take 20 mg by mouth daily      clopidogrel (PLAVIX) 75 mg tablet Take 75 mg by mouth daily  1    Coenzyme Q10 (COQ10) 200 MG CAPS Take by mouth      diazepam (VALIUM) 5 mg tablet TAKE 1 TABLET BY MOUTH DAILY AS NEEDED      dutasteride (AVODART) 0.5 mg capsule Take 1 capsule (0.5 mg total) by mouth daily 90 capsule 3    hydrOXYzine HCL (ATARAX) 25 mg tablet TAKE 1 TABLET BY ORAL ROUTE 3 TIMES EVERY BEDTIME AS NEEDED      levothyroxine (Synthroid) 125 mcg tablet Take 1 tablet (125 mcg total) by mouth daily 100 tablet 6    meclizine (ANTIVERT) 25 mg tablet Take 1 tablet (25 mg total) by mouth every 8 (eight) hours as needed for dizziness 30 tablet 1    nitroglycerin (NITROSTAT) 0.4 mg SL tablet Place 0.4 mg under the tongue       "ondansetron (ZOFRAN) 4 mg tablet Take 1 tablet (4 mg total) by mouth every 6 (six) hours 12 tablet 0    tadalafil (Cialis) 20 MG tablet Take 1 tablet (20 mg total) by mouth as needed in the morning for erectile dysfunction. Take as directed. 30 tablet 0    tamsulosin (FLOMAX) 0.4 mg TAKE 2 CAPSULES BY MOUTH EVERY  capsule 1     No current facility-administered medications for this visit.     No Known Allergies    Objective   Vitals: Blood pressure 122/80, pulse 64, height 5' 10\" (1.778 m), weight 93.4 kg (206 lb).    Physical Exam  Constitutional:       General: He is not in acute distress.     Appearance: Normal appearance. He is not ill-appearing, toxic-appearing or diaphoretic.   HENT:      Head: Normocephalic and atraumatic.      Nose: Nose normal.   Eyes:      Extraocular Movements: Extraocular movements intact.      Conjunctiva/sclera: Conjunctivae normal.      Pupils: Pupils are equal, round, and reactive to light.   Neck:      Thyroid: No thyroid mass, thyromegaly or thyroid tenderness.      Comments: Well-healed transverse scar  Cardiovascular:      Rate and Rhythm: Normal rate.   Pulmonary:      Effort: Pulmonary effort is normal. No respiratory distress.   Abdominal:      General: There is no distension.   Musculoskeletal:         General: No deformity.      Right lower leg: No edema.      Left lower leg: No edema.   Lymphadenopathy:      Cervical: No cervical adenopathy.   Skin:     General: Skin is warm and dry.   Neurological:      General: No focal deficit present.      Mental Status: He is alert. Mental status is at baseline.   Psychiatric:         Mood and Affect: Mood normal.         Behavior: Behavior normal.         Thought Content: Thought content normal.         The history was obtained from the review of the chart, patient.    Lab Results:   Lab Results   Component Value Date/Time    TSH 3RD [facility] 84.806 (H) 03/27/2024 01:53 PM    Free T4 1.10 03/27/2024 01:53 PM    Free t4 1.07 " "05/16/2024 11:47 AM       Imaging Studies:       I have personally reviewed pertinent reports.      Portions of the record may have been created with voice recognition software. Occasional wrong word or \"sound a like\" substitutions may have occurred due to the inherent limitations of voice recognition software. Read the chart carefully and recognize, using context, where substitutions have occurred.  "

## 2024-11-21 ENCOUNTER — OFFICE VISIT (OUTPATIENT)
Dept: ENDOCRINOLOGY | Facility: CLINIC | Age: 74
End: 2024-11-21
Payer: COMMERCIAL

## 2024-11-21 VITALS
WEIGHT: 206 LBS | DIASTOLIC BLOOD PRESSURE: 80 MMHG | HEIGHT: 70 IN | BODY MASS INDEX: 29.49 KG/M2 | SYSTOLIC BLOOD PRESSURE: 122 MMHG | HEART RATE: 64 BPM

## 2024-11-21 DIAGNOSIS — E89.0 POSTOPERATIVE HYPOTHYROIDISM: ICD-10-CM

## 2024-11-21 DIAGNOSIS — C73 PAPILLARY THYROID CARCINOMA (HCC): Primary | ICD-10-CM

## 2024-11-21 DIAGNOSIS — E89.0 S/P TOTAL THYROIDECTOMY: ICD-10-CM

## 2024-11-21 PROCEDURE — 99214 OFFICE O/P EST MOD 30 MIN: CPT | Performed by: INTERNAL MEDICINE

## 2024-11-21 RX ORDER — LEVOTHYROXINE SODIUM 125 UG/1
125 TABLET ORAL DAILY
Qty: 100 TABLET | Refills: 6 | Status: SHIPPED | OUTPATIENT
Start: 2024-11-21

## 2024-11-21 NOTE — ASSESSMENT & PLAN NOTE
-- Thyroglobulin is undetectable September 2024  -- Patient has been taking his levothyroxine 125 mcg appropriately and TSH is at goal , which is TSH is low normal or below normal  --Counseled on hypothyyroid/hyperthyroid symptoms to be aware of. Counseled on thyroid axis physiology.     -- Will obtain ultrasound, patient no longer following with ENT  -- Anticipate annual thyroglobulin checks  --will updated thyroid function in next month

## 2024-12-03 ENCOUNTER — HOSPITAL ENCOUNTER (OUTPATIENT)
Dept: ULTRASOUND IMAGING | Facility: HOSPITAL | Age: 74
Discharge: HOME | End: 2024-12-03
Payer: COMMERCIAL

## 2024-12-03 DIAGNOSIS — C73 PAPILLARY THYROID CARCINOMA (HCC): ICD-10-CM

## 2024-12-03 PROCEDURE — 76536 US EXAM OF HEAD AND NECK: CPT

## 2024-12-09 ENCOUNTER — RESULTS FOLLOW-UP (OUTPATIENT)
Dept: ENDOCRINOLOGY | Facility: CLINIC | Age: 74
End: 2024-12-09

## 2024-12-26 ENCOUNTER — HOSPITAL ENCOUNTER (OUTPATIENT)
Dept: MRI IMAGING | Facility: HOSPITAL | Age: 74
Discharge: HOME | End: 2024-12-26
Attending: OTOLARYNGOLOGY
Payer: COMMERCIAL

## 2024-12-26 DIAGNOSIS — H90.3 SENSORINEURAL HEARING LOSS (SNHL) OF BOTH EARS: ICD-10-CM

## 2024-12-26 DIAGNOSIS — H91.8X3 ASYMMETRICAL HEARING LOSS: ICD-10-CM

## 2024-12-26 DIAGNOSIS — H81.91 PERIPHERAL VESTIBULOPATHY OF RIGHT EAR: ICD-10-CM

## 2024-12-26 DIAGNOSIS — D33.3 UNILATERAL VESTIBULAR SCHWANNOMA (HCC): ICD-10-CM

## 2024-12-26 PROCEDURE — 70553 MRI BRAIN STEM W/O & W/DYE: CPT

## 2024-12-26 PROCEDURE — A9585 GADOBUTROL INJECTION: HCPCS | Performed by: FAMILY MEDICINE

## 2024-12-26 RX ORDER — GADOBUTROL 604.72 MG/ML
9 INJECTION INTRAVENOUS
Status: COMPLETED | OUTPATIENT
Start: 2024-12-26 | End: 2024-12-26

## 2024-12-26 RX ADMIN — GADOBUTROL 9 ML: 604.72 INJECTION INTRAVENOUS at 18:27

## 2025-03-10 ENCOUNTER — TELEPHONE (OUTPATIENT)
Age: 75
End: 2025-03-10

## 2025-03-10 ENCOUNTER — PREP FOR PROCEDURE (OUTPATIENT)
Age: 75
End: 2025-03-10

## 2025-03-10 DIAGNOSIS — Z86.0100 HISTORY OF COLON POLYPS: Primary | ICD-10-CM

## 2025-03-10 NOTE — TELEPHONE ENCOUNTER
03/10/25  Screened by: Ofelia Casillas    Referring Provider Dr. Andrews      Pre- Screening:     There is no height or weight on file to calculate BMI.   29.56  Has patient been referred for a routine screening Colonoscopy? yes  Is the patient between 45-75 years old? yes      Previous Colonoscopy yes   If yes:    Date: 2018    Facility:     Reason:           Does the patient want to see a Gastroenterologist prior to their procedure OR are they having any GI symptoms? no    Has the patient been hospitalized or had abdominal surgery in the past 6 months? no    Does the patient use supplemental oxygen? no    Does the patient take Coumadin, Lovenox, Plavix, Elliquis, Xarelto, or other blood thinning medication? yes    Has the patient had a stroke, cardiac event, or stent placed in the past year? no        If patient is between 45yrs - 49yrs, please advise patient that we will have to confirm benefits & coverage with their insurance company for a routine screening colonoscopy.

## 2025-03-10 NOTE — TELEPHONE ENCOUNTER
Scheduled date of colonoscopy (as of today):  4/30/25    Physician performing colonoscopy:  Dr. Andrews    Location of colonoscopy:  [facility]    Bowel prep reviewed with patient:  [email or web address]   [email or web address]

## 2025-03-18 ENCOUNTER — RESULTS FOLLOW-UP (OUTPATIENT)
Dept: ENDOCRINOLOGY | Facility: CLINIC | Age: 75
End: 2025-03-18

## 2025-03-18 LAB
T4 FREE SERPL-MCNC: 1.07 NG/DL (ref 0.61–1.12)
TSH SERPL-ACNC: 0.23 UIU/ML (ref 0.45–5.33)

## 2025-03-26 ENCOUNTER — TELEPHONE (OUTPATIENT)
Dept: UROLOGY | Facility: CLINIC | Age: 75
End: 2025-03-26

## 2025-03-26 NOTE — PROGRESS NOTES
Laureano Marti 74 y.o. male MRN: [id number]    Encounter: [id number]      Assessment & Plan     74 y.o. male with 4.8 cm papillary thyroid cancer status post total thyroidectomy October 2023, post PELLETIER March 2024.    1. Papillary thyroid carcinoma (HCC)  Assessment & Plan:  Counseled on normal thyroid physiology and post thyroidectomy/cancer/ hormone replacement pathophysiology   TSH goal low-normal to slightly below normal now  No recurrence on December imaging, will not plan for more unless neck findings or thyroglobulin rise     Update thyroid function and thyroglobulin in 6m  If continues to do well, anticipate spacing out to 1y follow ups   Orders:  -     Anti-thyroglobulin antibody; Future; Expected date: 09/27/2025  -     Thyroglobulin; Future; Expected date: 09/27/2025  -     TSH + Free T4; Future; Expected date: 09/27/2025  2. Postsurgical hypothyroidism  -     TSH + Free T4; Future; Expected date: 09/27/2025      6m fu Bear     C:  papillary thyroid cancer fu    History of Present Illness     HPI:  Laureano Marti is a 74 y.o. male presents in follow up for history of papillary thyroid cancer postresection.  Also with history of HTN, HLD, CAD s/p stenting, anxiety.  Last seen in follow-up November 2024.    10/2022 patient underwent carotid artery Doppler that incidentally noted large left thyroid lobe vascular mass.1/25/2023 thyroid ultrasound with enlarged homogenous thyroid, with well-defined 5.4 X3.8 X 3.6 cm solid nodule, which was biopsied on 8/21/2023 with findings of [place] VI papillary thyroid carcinoma.  8/23/2023 ultrasound head neck lymph nodes without dangelo metastatic disease patient underwent total thyroidectomy on 10/11/2023 with Dr. Aldana ENT.  He underwent PELLETIER with 124.6 mCi  3/2024. No metastatic disease on follow up scan.    He remains on levothyroxine 125 mcg. Taking appropriately.  Morning, empty stomach, 45-1hr minutes before food and medications.    Last imaging ultrasound head  neck lymph node mapping December 2024 without evidence of recurrent or metastatic disease.     3/18/2025 TSH 0.23, fT4 1.07   9/5/2024 TSH 0.57, thyroglobulin <0.01, thyroglobulin antibody negative  5/16/2024 TSH 0.25, free T4 1.07 Thyroglobulin 0.13 thyroglobulin antibody negative  3/27/2024 post Thyrogen administration pre-PELLETIER TSH 84 Free T4 1.1 Thyroglobulin 4.4 Thyroglobulin antibody negative  12/6/2023 TSH 0.69 levothyroxine increased to 125 mcg  10/2023 total thyroidectomy levothyroxine initiated 112 mcg  1/2023 TSH 1.74  9/2019 TSH 2.14     10/11/2023   A.  Thyroid (total thyroidectomy):     - Papillary thyroid carcinoma (4.8 cm, left lobe), classic subtype with prominent follicular architecture, encapsulated with focal capsular invasion.     - Two (2) additional foci of papillary microcarcinoma, classic subtype (0.1 and 0.3 cm, left lobe).     - One (1) perithyroidal lymph node, negative for carcinoma.      - Negative for extrathyroidal extension.     - Negative for lymph-vascular invasion.     Review of Systems   Constitutional:  Negative for activity change, appetite change, fatigue and unexpected weight change.   HENT:  Negative for sore throat, trouble swallowing and voice change.    Gastrointestinal:  Positive for diarrhea. Negative for abdominal distention.        More sensitive to spicy foods, heartburn, gas   Endocrine: Negative for cold intolerance and heat intolerance.       Historical Information   Past Medical History:   Diagnosis Date    Abnormal serum cholesterol     Anxiety     Back strain     BPH (benign prostatic hyperplasia)     CAD (coronary artery disease)     Dizziness     HL (hearing loss)     Wears hearing aids    Hyperlipidemia      Past Surgical History:   Procedure Laterality Date    ACHILLES TENDON REPAIR      ADENOIDECTOMY      CATARACT EXTRACTION Right     COLONOSCOPY      CORONARY ANGIOPLASTY WITH STENT PLACEMENT      JOINT REPLACEMENT      KNEE SURGERY      MI COLONOSCOPY FLX  DX W/COLLJ SPEC WHEN PFRMD N/A 10/23/2018    Procedure: COLONOSCOPY;  Surgeon: Dave Andrews MD;  Location[facility];  Service: Gastroenterology    MT THYROIDECTOMY TOTAL/COMPLETE N/A 10/11/2023    Procedure: TOTAL THYROIDECTOMY WITH NIMS;  Surgeon: Power Aldana MD;  Location: [facility];  Service: ENT    TONSILLECTOMY      US GUIDED THYROID BIOPSY  2023     Social History   Social History     Substance and Sexual Activity   Alcohol Use Yes    Alcohol/week: 4.0 standard drinks of alcohol    Types: 4 Glasses of wine per week    Comment: occasionally, 2-3 per week.      Social History     Substance and Sexual Activity   Drug Use No     Social History     Tobacco Use   Smoking Status Former    Current packs/day: 0.00    Average packs/day: 1 pack/day for 15.0 years (15.0 ttl pk-yrs)    Types: Cigarettes    Quit date: 10/5/1990    Years since quittin.4   Smokeless Tobacco Never     Family History:   Family History   Problem Relation Age of Onset    Colon cancer Mother     Cancer Mother     Stroke Father     Thyroid cancer Sister     Prostate cancer Brother     Thyroid disease unspecified Sister        Meds/Allergies   Current Outpatient Medications   Medication Sig Dispense Refill    atorvastatin (LIPITOR) 40 mg tablet TAKE 1 TABLET BY MOUTH DAILY AT NIGHT      citalopram (CeleXA) 20 mg tablet Take 20 mg by mouth daily      clopidogrel (PLAVIX) 75 mg tablet Take 75 mg by mouth daily  1    Coenzyme Q10 (COQ10) 200 MG CAPS Take by mouth      diazepam (VALIUM) 5 mg tablet TAKE 1 TABLET BY MOUTH DAILY AS NEEDED      dutasteride (AVODART) 0.5 mg capsule Take 1 capsule (0.5 mg total) by mouth daily 90 capsule 3    hydrOXYzine HCL (ATARAX) 25 mg tablet TAKE 1 TABLET BY ORAL ROUTE 3 TIMES EVERY BEDTIME AS NEEDED      levothyroxine (Synthroid) 125 mcg tablet Take 1 tablet (125 mcg total) by mouth daily 100 tablet 6    meclizine (ANTIVERT) 25 mg tablet Take 1 tablet (25 mg total) by mouth every 8 (eight) hours as  "needed for dizziness 30 tablet 1    ondansetron (ZOFRAN) 4 mg tablet Take 1 tablet (4 mg total) by mouth every 6 (six) hours 12 tablet 0    tadalafil (Cialis) 20 MG tablet Take 1 tablet (20 mg total) by mouth as needed in the morning for erectile dysfunction. Take as directed. 30 tablet 0    tamsulosin (FLOMAX) 0.4 mg TAKE 2 CAPSULES BY MOUTH EVERY  capsule 1    nitroglycerin (NITROSTAT) 0.4 mg SL tablet Place 0.4 mg under the tongue       No current facility-administered medications for this visit.     No Known Allergies    Objective   Vitals: Blood pressure 118/54, pulse 87, height 5' 10\" (1.778 m), weight 91.2 kg (201 lb), SpO2 96%.    Physical Exam  Constitutional:       General: He is not in acute distress.     Appearance: Normal appearance. He is not ill-appearing, toxic-appearing or diaphoretic.   HENT:      Head: Normocephalic and atraumatic.      Nose: Nose normal.   Eyes:      Extraocular Movements: Extraocular movements intact.      Conjunctiva/sclera: Conjunctivae normal.      Pupils: Pupils are equal, round, and reactive to light.   Neck:      Thyroid: No thyroid mass, thyromegaly or thyroid tenderness.      Comments: Well-healed transverse scar  Cardiovascular:      Rate and Rhythm: Normal rate.   Pulmonary:      Effort: Pulmonary effort is normal. No respiratory distress.   Abdominal:      General: There is no distension.   Musculoskeletal:         General: No deformity.      Right lower leg: No edema.      Left lower leg: No edema.   Lymphadenopathy:      Cervical: No cervical adenopathy.   Skin:     General: Skin is warm and dry.   Neurological:      General: No focal deficit present.      Mental Status: He is alert. Mental status is at baseline.   Psychiatric:         Mood and Affect: Mood normal.         Behavior: Behavior normal.         Thought Content: Thought content normal.         The history was obtained from the review of the chart, patient.    Lab Results:   Lab Results   Component " "Value Date/Time    TSH 3RD [facility] 84.806 (H) 03/27/2024 01:53 PM    Free T4 1.10 03/27/2024 01:53 PM    Free t4 1.07 03/18/2025 09:31 AM    Free t4 1.07 05/16/2024 11:47 AM       Imaging Studies:       I have personally reviewed pertinent reports.      Portions of the record may have been created with voice recognition software. Occasional wrong word or \"sound a like\" substitutions may have occurred due to the inherent limitations of voice recognition software. Read the chart carefully and recognize, using context, where substitutions have occurred.  "

## 2025-03-27 ENCOUNTER — OFFICE VISIT (OUTPATIENT)
Dept: ENDOCRINOLOGY | Facility: CLINIC | Age: 75
End: 2025-03-27
Payer: COMMERCIAL

## 2025-03-27 VITALS
BODY MASS INDEX: 28.77 KG/M2 | SYSTOLIC BLOOD PRESSURE: 118 MMHG | HEART RATE: 87 BPM | WEIGHT: 201 LBS | DIASTOLIC BLOOD PRESSURE: 54 MMHG | OXYGEN SATURATION: 96 % | HEIGHT: 70 IN

## 2025-03-27 DIAGNOSIS — E89.0 POSTSURGICAL HYPOTHYROIDISM: ICD-10-CM

## 2025-03-27 DIAGNOSIS — C73 PAPILLARY THYROID CARCINOMA (HCC): Primary | ICD-10-CM

## 2025-03-27 PROCEDURE — 99214 OFFICE O/P EST MOD 30 MIN: CPT | Performed by: INTERNAL MEDICINE

## 2025-03-27 NOTE — ASSESSMENT & PLAN NOTE
Counseled on normal thyroid physiology and post thyroidectomy/cancer/ hormone replacement pathophysiology   TSH goal low-normal to slightly below normal now  No recurrence on December imaging, will not plan for more unless neck findings or thyroglobulin rise     Update thyroid function and thyroglobulin in 6m  If continues to do well, anticipate spacing out to 1y follow ups

## 2025-04-09 ENCOUNTER — TELEPHONE (OUTPATIENT)
Age: 75
End: 2025-04-09

## 2025-04-09 DIAGNOSIS — N40.1 BENIGN PROSTATIC HYPERPLASIA WITH LOWER URINARY TRACT SYMPTOMS, SYMPTOM DETAILS UNSPECIFIED: ICD-10-CM

## 2025-04-09 RX ORDER — DUTASTERIDE 0.5 MG/1
0.5 CAPSULE, LIQUID FILLED ORAL DAILY
Qty: 90 CAPSULE | Refills: 0 | Status: SHIPPED | OUTPATIENT
Start: 2025-04-09

## 2025-04-09 NOTE — TELEPHONE ENCOUNTER
PA for JAKE  APPROVED     Date(s) approved UNTIL 04/09/2026    Case #    Patient advised by          []MyChart Message  []Phone call   [x]LMOM  []L/M to call office as no active Communication consent on file  []Unable to leave detailed message as VM not approved on Communication consent       Pharmacy advised by    [x]Fax  []Phone call  []Secure Chat    Specialty Pharmacy    []     Approval letter scanned into Media No WILL SCAN UPON RECEIPT

## 2025-04-09 NOTE — TELEPHONE ENCOUNTER
PA for DUTASTERIDE SUBMITTED to [employer]    via      [x][employer]-Case ID # [id number]         [x]PA sent as URGENT    All office notes, labs and other pertaining documents and studies sent. Clinical questions answered. Awaiting determination from insurance company.     Turnaround time for your insurance to make a decision on your Prior Authorization can take 7-21 business days.

## 2025-04-10 DIAGNOSIS — N40.1 BPH WITH OBSTRUCTION/LOWER URINARY TRACT SYMPTOMS: ICD-10-CM

## 2025-04-10 DIAGNOSIS — N13.8 BPH WITH OBSTRUCTION/LOWER URINARY TRACT SYMPTOMS: ICD-10-CM

## 2025-04-10 RX ORDER — TAMSULOSIN HYDROCHLORIDE 0.4 MG/1
0.8 CAPSULE ORAL DAILY
Qty: 180 CAPSULE | Refills: 1 | Status: SHIPPED | OUTPATIENT
Start: 2025-04-10

## 2025-04-15 ENCOUNTER — ANESTHESIA EVENT (OUTPATIENT)
Dept: ANESTHESIOLOGY | Facility: HOSPITAL | Age: 75
End: 2025-04-15

## 2025-04-15 ENCOUNTER — ANESTHESIA (OUTPATIENT)
Dept: ANESTHESIOLOGY | Facility: HOSPITAL | Age: 75
End: 2025-04-15

## 2025-04-21 ENCOUNTER — TELEPHONE (OUTPATIENT)
Age: 75
End: 2025-04-21

## 2025-04-21 NOTE — TELEPHONE ENCOUNTER
Pt is on Plavix and scheduled for his colonoscopy 4/30 with Dr. Andrews. Left message for patient to contact me asap with name of provider who has him on the Plavix so I can request urgent clearance to hold 5 days, otherwise we will have to postpone procedure.

## 2025-04-23 NOTE — TELEPHONE ENCOUNTER
Patient now has an office visit scheduled 5/14 to discuss having an EGD done with his colonoscopy. He cancelled the colonoscopy on 4/30.  I should have clearance back from Dr. Vaz tomorrow, which will be good for the egd/colon to be rescheduled after his office visit.

## 2025-04-23 NOTE — TELEPHONE ENCOUNTER
Spoke with Emmy at Dr. Vaz office. She will put a message in Epic and give to Dr. Vaz tomorrow. She will either fax it back or call me if patient isn't cleared. Will check tomorrow.

## 2025-05-13 NOTE — PROGRESS NOTES
Name: Laureano Marti      : 1950      MRN: [id number]  Encounter Provider: Ratna Campos PA-C  Encounter Date: 2025   Encounter department: [facility] GASTROENTEROLOGY [place] [address]  :  Assessment & Plan  Gastroesophageal reflux disease, unspecified whether esophagitis present  Patient with complaints of acid reflux. Denies nausea, vomiting, abdominal pain, dysphagia. Occasional NSAID use. He is taking OTC Nexium as needed for symptoms.     - Will plan for EGD at time of colonoscopy as below for further evaluation  - Antireflux diet and measures reviewed with patient  - Discussed that patient could try taking OTC Nexium on a daily basis and see if this improves symptoms  - Patient would like to wait until EGD is performed before considering any other medications as he does not want to take more daily medications if he does not have to  - Patient is on Plavix, per chart review clearance was previously obtained from patient's cardiologist Dr. Vaz, refer to telephone encounter on 25    Orders:    EGD; Future    Screening for colon cancer  Last colonoscopy in 2018 as below.  He is overdue for colonoscopy.  Order in place for colonoscopy.           History of Present Illness   Laureano Marti is a 74 y.o. male who presents for office visit.  He was scheduled to undergo screening colonoscopy but wanted to come in for evaluation for potential EGD.  He reports that he has been experiencing acid reflux.  Notices it especially after eating spicy foods or when he is in stressful situations.  He also reports that if he eats dinner late at night he experiences reflux symptoms.  He reports that he takes over-the-counter Nexium when he has discomfort and this works for him.  He denies nausea, vomiting, dysphagia, abdominal pain.  No unintentional weight loss.  Reports occasional NSAID use.  He has never had an EGD.  His last colonoscopy was in 2018 as below.  He denies change  in bowel habits.  He reports that he was having issues with constipation but since starting levothyroxine he is actually going more regularly.  Denies hematochezia or melena.  Reports that his mother had colon cancer and was diagnosed around age 70.    Last colonoscopy in 2018 with tubular adenoma in descending colon, hyperplastic polyp in rectum, otherwise normal colonoscopy with good prep and visualization. Mild internal hemorrhoids. Repeat recommended in 5 years.       HPI  History obtained from: patient  Review of Systems A complete review of systems is negative other than that noted above in the HPI.         Objective   There were no vitals taken for this visit.    Physical Exam  Vitals reviewed.   Constitutional:       General: He is not in acute distress.     Appearance: Normal appearance.     Cardiovascular:      Rate and Rhythm: Normal rate.   Pulmonary:      Effort: Pulmonary effort is normal. No respiratory distress.   Abdominal:      Palpations: Abdomen is soft.      Tenderness: There is no abdominal tenderness. There is no guarding or rebound.     Musculoskeletal:      Cervical back: Neck supple.     Skin:     General: Skin is warm and dry.     Neurological:      General: No focal deficit present.      Mental Status: He is alert.     Psychiatric:         Mood and Affect: Mood normal.         Behavior: Behavior normal.        Lab Results: I personally reviewed relevant lab results.

## 2025-05-14 ENCOUNTER — TELEPHONE (OUTPATIENT)
Dept: GASTROENTEROLOGY | Facility: CLINIC | Age: 75
End: 2025-05-14

## 2025-05-14 ENCOUNTER — CONSULT (OUTPATIENT)
Dept: GASTROENTEROLOGY | Facility: CLINIC | Age: 75
End: 2025-05-14

## 2025-05-14 VITALS
BODY MASS INDEX: 28.63 KG/M2 | HEIGHT: 70 IN | SYSTOLIC BLOOD PRESSURE: 132 MMHG | WEIGHT: 200 LBS | DIASTOLIC BLOOD PRESSURE: 77 MMHG | HEART RATE: 71 BPM

## 2025-05-14 DIAGNOSIS — K21.9 GASTROESOPHAGEAL REFLUX DISEASE, UNSPECIFIED WHETHER ESOPHAGITIS PRESENT: Primary | ICD-10-CM

## 2025-05-14 DIAGNOSIS — Z12.11 SCREENING FOR COLON CANCER: ICD-10-CM

## 2025-05-14 RX ORDER — SODIUM CHLORIDE, SODIUM LACTATE, POTASSIUM CHLORIDE, CALCIUM CHLORIDE 600; 310; 30; 20 MG/100ML; MG/100ML; MG/100ML; MG/100ML
125 INJECTION, SOLUTION INTRAVENOUS CONTINUOUS
OUTPATIENT
Start: 2025-05-14

## 2025-05-14 NOTE — TELEPHONE ENCOUNTER
Scheduled date of EGD/colonoscopy (as of today): 7/10/25  Physician performing EGD/colonoscopy: Dr Andrews  [facility] of [facility]colonoscopy[facility]  Desired bowel prep reviewed with patient: Miralax w/ dul given at appt   Instructions reviewed with patient by: ls  Clearances:  Plavix clearance in chart under Media dated 4/24/25 - pt cleared to hold 5 days prior to the procedure per Dr Vaz.

## 2025-06-26 ENCOUNTER — ANESTHESIA (OUTPATIENT)
Dept: ANESTHESIOLOGY | Facility: HOSPITAL | Age: 75
End: 2025-06-26

## 2025-06-26 ENCOUNTER — ANESTHESIA EVENT (OUTPATIENT)
Dept: ANESTHESIOLOGY | Facility: HOSPITAL | Age: 75
End: 2025-06-26

## 2025-07-07 ENCOUNTER — OFFICE VISIT (OUTPATIENT)
Dept: UROLOGY | Facility: CLINIC | Age: 75
End: 2025-07-07
Payer: COMMERCIAL

## 2025-07-07 VITALS
WEIGHT: 200 LBS | HEART RATE: 77 BPM | HEIGHT: 70 IN | OXYGEN SATURATION: 97 % | SYSTOLIC BLOOD PRESSURE: 138 MMHG | DIASTOLIC BLOOD PRESSURE: 72 MMHG | BODY MASS INDEX: 28.63 KG/M2

## 2025-07-07 DIAGNOSIS — N13.8 BPH WITH OBSTRUCTION/LOWER URINARY TRACT SYMPTOMS: Primary | ICD-10-CM

## 2025-07-07 DIAGNOSIS — N40.1 BPH WITH OBSTRUCTION/LOWER URINARY TRACT SYMPTOMS: Primary | ICD-10-CM

## 2025-07-07 PROCEDURE — 99213 OFFICE O/P EST LOW 20 MIN: CPT | Performed by: PHYSICIAN ASSISTANT

## 2025-07-07 RX ORDER — MULTIVITAMIN
1 TABLET ORAL DAILY
COMMUNITY

## 2025-07-07 NOTE — PROGRESS NOTES
"Name: Laureano Marti      : 1950      MRN: [id number]  Encounter Provider: Dex Winslow PA-C  Encounter Date: 2025   Encounter department: [facility] UROLOGY [place]  :  Assessment & Plan  BPH with obstruction/lower urinary tract symptoms  Follow-up in 1 year for his annual exam  Would need cystoscopy and TRUS prior to surgical planning if he decides.  Nothing concerning on his prostate exam today           History of Present Illness   Laureano Marti is a 75 y.o. male who presents history of BPH with obstruction.  He has been on Avodart and tamsulosin.  He does have moderate lower urinary tract symptoms.  We have talked about outlet surgeries in the past he has not had a cystoscopy since Dr. Love in 2018.  Last PSA was 1.98.  We talked about simple robotic prostatectomy as an ideal treatment for his 100 g prostate.  AUA SYMPTOM SCORE      Flowsheet Row Most Recent Value   AUA SYMPTOM SCORE    How often have you had a sensation of not emptying your bladder completely after you finished urinating? 1 (P)     How often have you had to urinate again less than two hours after you finished urinating? 1 (P)     How often have you found you stopped and started again several times when you urinate? 0 (P)     How often have you found it difficult to postpone urination? 0 (P)     How often have you had a weak urinary stream? 2 (P)     How often have you had to push or strain to begin urination? 1 (P)     How many times did you most typically get up to urinate from the time you went to bed at night until the time you got up in the morning? 3 (P)     Quality of Life: If you were to spend the rest of your life with your urinary condition just the way it is now, how would you feel about that? 3 (P)     AUA SYMPTOM SCORE 8 (P)            Review of Systems       Objective   /72 (BP Location: Left arm, Patient Position: Sitting, Cuff Size: Standard)   Pulse 77   Ht 5' 10\" (1.778 m)   Wt " "90.7 kg (200 lb)   SpO2 97%   BMI 28.70 kg/m²     Physical Exam GEN: no acute distress    RESP: breathing comfortably with no accessory muscle use    ABD: soft, non-tender, non-distended   INCISION:    EXT: no significant peripheral edema   (Male): Penis circumcised, phallus normal, meatus patent.  Testicles descended into scrotum bilaterally without masses nor tenderness.  No inguinal hernias bilaterally.  INO: Prostate is enlarged at 50 grams. The prostate is not boggy. The prostate is not tender.  No nodules noted      RADIOLOGY:   none        Results   No results found for: \"PSA\"  Lab Results   Component Value Date    CALCIUM 9.4 03/18/2025    K 4.9 03/18/2025    CO2 30 03/18/2025     03/18/2025    BUN 21 03/18/2025    CREATININE 1.17 03/18/2025     Lab Results   Component Value Date    WBC 8.27 06/29/2022    HGB 13.9 06/29/2022    HCT 41.2 06/29/2022    MCV 90 06/29/2022     06/29/2022       Office Urine Dip  No results found for this or any previous visit (from the past hour).        "

## 2025-07-07 NOTE — ASSESSMENT & PLAN NOTE
Follow-up in 1 year for his annual exam  Would need cystoscopy and TRUS prior to surgical planning if he decides.  Nothing concerning on his prostate exam today

## 2025-07-10 ENCOUNTER — ANESTHESIA EVENT (OUTPATIENT)
Dept: GASTROENTEROLOGY | Facility: AMBULARY SURGERY CENTER | Age: 75
End: 2025-07-10
Payer: COMMERCIAL

## 2025-07-10 ENCOUNTER — HOSPITAL ENCOUNTER (OUTPATIENT)
Dept: GASTROENTEROLOGY | Facility: AMBULARY SURGERY CENTER | Age: 75
Setting detail: OUTPATIENT SURGERY
End: 2025-07-10
Attending: INTERNAL MEDICINE
Payer: COMMERCIAL

## 2025-07-10 VITALS
OXYGEN SATURATION: 95 % | BODY MASS INDEX: 27.92 KG/M2 | HEART RATE: 65 BPM | DIASTOLIC BLOOD PRESSURE: 62 MMHG | WEIGHT: 195 LBS | SYSTOLIC BLOOD PRESSURE: 121 MMHG | RESPIRATION RATE: 20 BRPM | HEIGHT: 70 IN | TEMPERATURE: 97.2 F

## 2025-07-10 DIAGNOSIS — K21.9 GASTROESOPHAGEAL REFLUX DISEASE, UNSPECIFIED WHETHER ESOPHAGITIS PRESENT: ICD-10-CM

## 2025-07-10 DIAGNOSIS — Z86.0100 HISTORY OF COLON POLYPS: ICD-10-CM

## 2025-07-10 PROCEDURE — 45380 COLONOSCOPY AND BIOPSY: CPT | Performed by: INTERNAL MEDICINE

## 2025-07-10 PROCEDURE — 45385 COLONOSCOPY W/LESION REMOVAL: CPT | Performed by: INTERNAL MEDICINE

## 2025-07-10 PROCEDURE — 88305 TISSUE EXAM BY PATHOLOGIST: CPT | Performed by: PATHOLOGY

## 2025-07-10 PROCEDURE — 43239 EGD BIOPSY SINGLE/MULTIPLE: CPT | Performed by: INTERNAL MEDICINE

## 2025-07-10 RX ORDER — GLYCOPYRROLATE 0.2 MG/ML
INJECTION INTRAMUSCULAR; INTRAVENOUS AS NEEDED
Status: DISCONTINUED | OUTPATIENT
Start: 2025-07-10 | End: 2025-07-10

## 2025-07-10 RX ORDER — PROPOFOL 10 MG/ML
INJECTION, EMULSION INTRAVENOUS AS NEEDED
Status: DISCONTINUED | OUTPATIENT
Start: 2025-07-10 | End: 2025-07-10

## 2025-07-10 RX ORDER — LIDOCAINE HCL/PF 100 MG/5ML
SYRINGE (ML) INJECTION AS NEEDED
Status: DISCONTINUED | OUTPATIENT
Start: 2025-07-10 | End: 2025-07-10

## 2025-07-10 RX ORDER — PROPOFOL 10 MG/ML
INJECTION, EMULSION INTRAVENOUS CONTINUOUS PRN
Status: DISCONTINUED | OUTPATIENT
Start: 2025-07-10 | End: 2025-07-10

## 2025-07-10 RX ORDER — SODIUM CHLORIDE, SODIUM LACTATE, POTASSIUM CHLORIDE, CALCIUM CHLORIDE 600; 310; 30; 20 MG/100ML; MG/100ML; MG/100ML; MG/100ML
125 INJECTION, SOLUTION INTRAVENOUS CONTINUOUS
Status: DISCONTINUED | OUTPATIENT
Start: 2025-07-10 | End: 2025-07-14 | Stop reason: HOSPADM

## 2025-07-10 RX ORDER — PANTOPRAZOLE SODIUM 40 MG/1
40 TABLET, DELAYED RELEASE ORAL DAILY
Qty: 30 TABLET | Refills: 3 | Status: SHIPPED | OUTPATIENT
Start: 2025-07-10

## 2025-07-10 RX ORDER — SODIUM CHLORIDE 9 MG/ML
INJECTION, SOLUTION INTRAVENOUS CONTINUOUS PRN
Status: DISCONTINUED | OUTPATIENT
Start: 2025-07-10 | End: 2025-07-10

## 2025-07-10 RX ADMIN — GLYCOPYRROLATE 0.2 MG: 0.2 INJECTION INTRAMUSCULAR; INTRAVENOUS at 10:49

## 2025-07-10 RX ADMIN — PROPOFOL 140 MCG/KG/MIN: 10 INJECTION, EMULSION INTRAVENOUS at 10:53

## 2025-07-10 RX ADMIN — SIMETHICONE 30 MG: 20 SUSPENSION/ DROPS ORAL at 11:05

## 2025-07-10 RX ADMIN — LIDOCAINE HYDROCHLORIDE 40 MG: 20 INJECTION INTRAVENOUS at 10:49

## 2025-07-10 RX ADMIN — PROPOFOL 150 MG: 10 INJECTION, EMULSION INTRAVENOUS at 10:49

## 2025-07-10 RX ADMIN — SODIUM CHLORIDE: 0.9 INJECTION, SOLUTION INTRAVENOUS at 10:37

## 2025-07-10 NOTE — ANESTHESIA PREPROCEDURE EVALUATION
Procedure:  COLONOSCOPY  EGD    Relevant Problems   ANESTHESIA (within normal limits)      CARDIO   (+) CAD (coronary artery disease)   (+) HTN (hypertension)   (+) Hyperlipidemia      ENDO   (+) Postsurgical hypothyroidism      GI/HEPATIC (within normal limits)      /RENAL   (+) BPH with obstruction/lower urinary tract symptoms      GYN (within normal limits)      HEMATOLOGY (within normal limits)      MUSCULOSKELETAL (within normal limits)      NEURO/PSYCH   (+) Anxiety      PULMONARY (within normal limits)      TTE (10/2022):    Left Ventricle: Systolic function is normal with an ejection fraction   of 60-65%. There is grade I (mild) diastolic dysfunction and normal left   atrial pressure.     Left Atrium: Left atrium cavity is mildly dilated.     Mitral Valve: There is trace regurgitation.     Tricuspid Valve: There is trace regurgitation. The right ventricular   systolic pressure is normal.       Physical Exam    Airway     Mallampati score: II  TM Distance: >3 FB  Neck ROM: full      Cardiovascular  Rhythm: regular, Rate: normalCardiovascular exam normal    Dental   No notable dental hx     Pulmonary  Pulmonary exam normal Breath sounds clear to auscultation    Neurological    He appears awake and alert.      Other Findings        Anesthesia Plan  ASA Score- 2     Anesthesia Type- IV sedation with anesthesia with ASA Monitors.         Additional Monitors:     Airway Plan:            Plan Factors-Exercise tolerance (METS): >4 METS.    Chart reviewed.   Existing labs reviewed. Patient summary reviewed.          Obstructive sleep apnea risk education given perioperatively.        Induction- intravenous.    Postoperative Plan-     Perioperative Resuscitation Plan - Level 1 - Full Code.       Informed Consent- Anesthetic plan and risks discussed with patient.  I personally reviewed this patient with the CRNA. Discussed and agreed on the Anesthesia Plan with the CRNA..      NPO Status:  Vitals Value Taken Time    Date of last liquid 07/10/25 07/10/25 09:36   Time of last liquid 0430 07/10/25 09:36   Date of last solid 07/08/25 07/10/25 09:36   Time of last solid 1900 07/10/25 09:36

## 2025-07-10 NOTE — ANESTHESIA POSTPROCEDURE EVALUATION
Post-Op Assessment Note    CV Status:  Stable    Pain management: adequate       Mental Status:  Alert and awake   Hydration Status:  Euvolemic   PONV Controlled:  Controlled   Airway Patency:  Patent  Two or more mitigation strategies used for obstructive sleep apnea   Post Op Vitals Reviewed: Yes    No anethesia notable event occurred.    Staff: CRNA           Last Filed PACU Vitals:  Vitals Value Taken Time   Temp 97.1 °F (36.2 °C) 07/10/25 11:16   Pulse 60 07/10/25 11:16   /60 07/10/25 11:16   Resp 13 07/10/25 11:16   SpO2 98 % 07/10/25 11:16       Modified Alana:     Vitals Value Taken Time   Activity 2 07/10/25 11:17   Respiration 2 07/10/25 11:17   Circulation 2 07/10/25 11:17   Consciousness 2 07/10/25 11:17   Oxygen Saturation 2 07/10/25 11:17     Modified Alana Score: 10

## 2025-07-10 NOTE — ANESTHESIA POSTPROCEDURE EVALUATION
Post-Op Assessment Note    CV Status:  Stable  Pain Score: 0    Pain management: adequate       Mental Status:  Alert and awake   Hydration Status:  Euvolemic   PONV Controlled:  Controlled   Airway Patency:  Patent     Post Op Vitals Reviewed: Yes    No anethesia notable event occurred.    Staff: Anesthesiologist           Last Filed PACU Vitals:  Vitals Value Taken Time   Temp 97.2 °F (36.2 °C) 07/10/25 11:27   Pulse 65 07/10/25 11:27   /62 07/10/25 11:27   Resp 20 07/10/25 11:27   SpO2 95 % 07/10/25 11:27       Modified Alana:     Vitals Value Taken Time   Activity 2 07/10/25 11:17   Respiration 2 07/10/25 11:17   Circulation 2 07/10/25 11:17   Consciousness 2 07/10/25 11:17   Oxygen Saturation 2 07/10/25 11:17     Modified Alana Score: 10

## 2025-07-10 NOTE — H&P
"History and Physical -  [facility]  Laureano Marti 75 y.o. male MRN: [id number]    HPI: Laureano Marti is a 75 y.o. year old male who presents with GERD, hx of colon polyps.       Review of Systems    Historical Information   Past Medical History[1]  Past Surgical History[2]  Social History   Social History     Substance and Sexual Activity   Alcohol Use Yes    Alcohol/week: 4.0 standard drinks of alcohol    Types: 4 Glasses of wine per week    Comment: occasionally, 2-3 per week.      Social History     Substance and Sexual Activity   Drug Use No     Tobacco Use History[3]  Family History[4]    Meds/Allergies     Not in a hospital admission.    Allergies[5]    Objective     /69   Pulse 72   Temp (!) 96.8 °F (36 °C) (Temporal)   Resp 18   Ht 5' 10\" (1.778 m)   Wt 88.5 kg (195 lb)   SpO2 98%   BMI 27.98 kg/m²       PHYSICAL EXAM    Gen: NAD  CV: RRR  CHEST: Clear  ABD: soft, NT/ND  EXT: no edema  Neuro: AAO      ASSESSMENT/PLAN:  This is a 75 y.o. year old male here for GERD, hx of colon polyps.     PLAN:   Procedure: egd/colonoscopy           [1]   Past Medical History:  Diagnosis Date    Abnormal serum cholesterol     Anxiety     Back strain     BPH (benign prostatic hyperplasia)     CAD (coronary artery disease)     Colon polyp     Dizziness     HL (hearing loss)     Wears hearing aids    Hyperlipidemia    [2]   Past Surgical History:  Procedure Laterality Date    ACHILLES TENDON REPAIR      ADENOIDECTOMY      CATARACT EXTRACTION Right     COLONOSCOPY      CORONARY ANGIOPLASTY WITH STENT PLACEMENT      JOINT REPLACEMENT      KNEE SURGERY      MA COLONOSCOPY FLX DX W/COLLJ SPEC WHEN PFRMD N/A 10/23/2018    Procedure: COLONOSCOPY;  Surgeon: Dave Andrews MD;  [facility];  Service: Gastroenterology    MA THYROIDECTOMY TOTAL/COMPLETE N/A 10/11/2023    Procedure: TOTAL THYROIDECTOMY WITH NIMS;  Surgeon: Power Aldana MD;  [facility];  Service: ENT    " TONSILLECTOMY      US GUIDED THYROID BIOPSY  2023   [3]   Social History  Tobacco Use   Smoking Status Former    Current packs/day: 0.00    Average packs/day: 1 pack/day for 15.0 years (15.0 ttl pk-yrs)    Types: Cigarettes    Quit date: 10/5/1990    Years since quittin.7   Smokeless Tobacco Never   [4]   Family History  Problem Relation Name Age of Onset    Colon cancer Mother Sasha Marti     Cancer Mother Sasha Marti     Stroke Father Roosevelt Marti     Thyroid cancer Sister      Prostate cancer Brother      Thyroid disease unspecified Sister Sasha Jones    [5] No Known Allergies

## 2025-07-13 DIAGNOSIS — N40.1 BENIGN PROSTATIC HYPERPLASIA WITH LOWER URINARY TRACT SYMPTOMS, SYMPTOM DETAILS UNSPECIFIED: ICD-10-CM

## 2025-07-14 RX ORDER — DUTASTERIDE 0.5 MG/1
0.5 CAPSULE, LIQUID FILLED ORAL DAILY
Qty: 90 CAPSULE | Refills: 1 | Status: SHIPPED | OUTPATIENT
Start: 2025-07-14

## 2025-07-17 PROCEDURE — 88305 TISSUE EXAM BY PATHOLOGIST: CPT | Performed by: PATHOLOGY

## 2025-08-03 DIAGNOSIS — K21.9 GASTROESOPHAGEAL REFLUX DISEASE, UNSPECIFIED WHETHER ESOPHAGITIS PRESENT: ICD-10-CM

## 2025-08-04 RX ORDER — PANTOPRAZOLE SODIUM 40 MG/1
40 TABLET, DELAYED RELEASE ORAL DAILY
Qty: 90 TABLET | Refills: 1 | Status: SHIPPED | OUTPATIENT
Start: 2025-08-04

## (undated) DEVICE — SUT MONOCRYL 5-0 P-3 18 IN Y493G

## (undated) DEVICE — SUT VICRYL 2-0 SH 27 IN UNDYED J417H

## (undated) DEVICE — ELECTRODE BLADE MOD E-Z CLEAN  2.75IN 7CM -0012AM

## (undated) DEVICE — TIBURON SPLIT SHEET: Brand: CONVERTORS

## (undated) DEVICE — NEEDLE 25G X 1 1/2

## (undated) DEVICE — GLOVE SRG BIOGEL 7.5

## (undated) DEVICE — PACK UNIVERSAL NECK

## (undated) DEVICE — 3M™ STERI-STRIP™ REINFORCED ADHESIVE SKIN CLOSURES, R1547, 1/2 IN X 4 IN (12 MM X 100 MM), 6 STRIPS/ENVELOPE: Brand: 3M™ STERI-STRIP™

## (undated) DEVICE — SUT VICRYL 3-0 SH 27 IN J416H

## (undated) DEVICE — BIPOLAR CORD DISP

## (undated) DEVICE — PROBE 8225101 5PK STD PRASS FL TIP ROHS

## (undated) DEVICE — INTENDED FOR TISSUE SEPARATION, AND OTHER PROCEDURES THAT REQUIRE A SHARP SURGICAL BLADE TO PUNCTURE OR CUT.: Brand: BARD-PARKER SAFETY BLADES SIZE 15, STERILE

## (undated) DEVICE — SUT SILK 3-0 18 IN A184H

## (undated) DEVICE — 3M™ STERI-STRIP™ COMPOUND BENZOIN TINCTURE 40 BAGS/CARTON 4 CARTONS/CASE C1544: Brand: 3M™ STERI-STRIP™

## (undated) DEVICE — CHLORAPREP HI-LITE 26ML ORANGE

## (undated) DEVICE — ELECTRODE 8227411 PAIRED 4 CH SET ROHS

## (undated) DEVICE — LIGACLIP MCA MULTIPLE CLIP APPLIERS, 20 MEDIUM CLIPS: Brand: LIGACLIP

## (undated) DEVICE — SURGICEL FIBRILLAR 1 X 2

## (undated) DEVICE — DECANTER: Brand: UNBRANDED